# Patient Record
Sex: FEMALE | Race: WHITE | HISPANIC OR LATINO | Employment: FULL TIME | URBAN - METROPOLITAN AREA
[De-identification: names, ages, dates, MRNs, and addresses within clinical notes are randomized per-mention and may not be internally consistent; named-entity substitution may affect disease eponyms.]

---

## 2017-03-10 ENCOUNTER — TELEPHONE (OUTPATIENT)
Dept: OTHER | Facility: MEDICAL CENTER | Age: 42
End: 2017-03-10

## 2017-03-10 NOTE — PROGRESS NOTES
Phoned to schedule visit to deliver and review lung cancer treatment summary/ survivorship care plan.  Pt stated she cares for her 10 month old grandson 7 days a week and it would be inconvenient to come in to meet in person. Pt prefers to have SCP mailed. Will follow up by telephone once she receives it.

## 2017-04-04 ENCOUNTER — TELEPHONE (OUTPATIENT)
Dept: OTHER | Facility: MEDICAL CENTER | Age: 42
End: 2017-04-04

## 2017-04-04 NOTE — PROGRESS NOTES
Phoned to review lung cancer treatment summary/ survivorship care plan which was mailed to pt on 3/20/17.  No answer, left message to call back.

## 2017-04-07 ENCOUNTER — TELEPHONE (OUTPATIENT)
Dept: OTHER | Facility: MEDICAL CENTER | Age: 42
End: 2017-04-07

## 2017-04-07 NOTE — PROGRESS NOTES
Phoned to schedule visit to deliver and review lung cancer treatment summary/ survivorship care plan.  No answer, left message to call back.

## 2017-04-11 ENCOUNTER — TELEPHONE (OUTPATIENT)
Dept: OTHER | Facility: MEDICAL CENTER | Age: 42
End: 2017-04-11

## 2017-04-11 NOTE — PROGRESS NOTES
Third attempt to review lung cancer treatment summary/ survivorship care plan with patient by telephone. Left message to call back.

## 2017-08-20 ENCOUNTER — HOSPITAL ENCOUNTER (INPATIENT)
Facility: MEDICAL CENTER | Age: 42
LOS: 3 days | DRG: 872 | End: 2017-08-24
Attending: EMERGENCY MEDICINE | Admitting: INTERNAL MEDICINE
Payer: COMMERCIAL

## 2017-08-20 ENCOUNTER — APPOINTMENT (OUTPATIENT)
Dept: RADIOLOGY | Facility: MEDICAL CENTER | Age: 42
DRG: 872 | End: 2017-08-20
Attending: EMERGENCY MEDICINE
Payer: COMMERCIAL

## 2017-08-20 DIAGNOSIS — R50.9 FEVER, UNSPECIFIED FEVER CAUSE: ICD-10-CM

## 2017-08-20 DIAGNOSIS — N12 PYELONEPHRITIS: Primary | ICD-10-CM

## 2017-08-20 LAB
ALBUMIN SERPL BCP-MCNC: 3.3 G/DL (ref 3.2–4.9)
ALBUMIN/GLOB SERPL: 1.1 G/DL
ALP SERPL-CCNC: 62 U/L (ref 30–99)
ALT SERPL-CCNC: 23 U/L (ref 2–50)
ANION GAP SERPL CALC-SCNC: 8 MMOL/L (ref 0–11.9)
APPEARANCE UR: ABNORMAL
APTT PPP: 27.7 SEC (ref 24.7–36)
AST SERPL-CCNC: 20 U/L (ref 12–45)
BACTERIA #/AREA URNS HPF: ABNORMAL /HPF
BASOPHILS # BLD AUTO: 0.1 % (ref 0–1.8)
BASOPHILS # BLD: 0.01 K/UL (ref 0–0.12)
BILIRUB SERPL-MCNC: 0.6 MG/DL (ref 0.1–1.5)
BILIRUB UR QL STRIP.AUTO: NEGATIVE
BUN SERPL-MCNC: 11 MG/DL (ref 8–22)
CALCIUM SERPL-MCNC: 8.3 MG/DL (ref 8.4–10.2)
CHLORIDE SERPL-SCNC: 104 MMOL/L (ref 96–112)
CO2 SERPL-SCNC: 21 MMOL/L (ref 20–33)
COLOR UR: ABNORMAL
CREAT SERPL-MCNC: 0.8 MG/DL (ref 0.5–1.4)
EOSINOPHIL # BLD AUTO: 0.02 K/UL (ref 0–0.51)
EOSINOPHIL NFR BLD: 0.3 % (ref 0–6.9)
EPI CELLS #/AREA URNS HPF: ABNORMAL /HPF
ERYTHROCYTE [DISTWIDTH] IN BLOOD BY AUTOMATED COUNT: 43.8 FL (ref 35.9–50)
GFR SERPL CREATININE-BSD FRML MDRD: >60 ML/MIN/1.73 M 2
GLOBULIN SER CALC-MCNC: 2.9 G/DL (ref 1.9–3.5)
GLUCOSE SERPL-MCNC: 105 MG/DL (ref 65–99)
GLUCOSE UR STRIP.AUTO-MCNC: NEGATIVE MG/DL
HCG UR QL: NEGATIVE
HCT VFR BLD AUTO: 32.9 % (ref 37–47)
HGB BLD-MCNC: 10.4 G/DL (ref 12–16)
IMM GRANULOCYTES # BLD AUTO: 0.02 K/UL (ref 0–0.11)
IMM GRANULOCYTES NFR BLD AUTO: 0.3 % (ref 0–0.9)
INR PPP: 1.11 (ref 0.87–1.13)
KETONES UR STRIP.AUTO-MCNC: NEGATIVE MG/DL
LACTATE BLD-SCNC: 1.3 MMOL/L (ref 0.5–2)
LACTATE BLD-SCNC: 1.46 MMOL/L (ref 0.5–2)
LEUKOCYTE ESTERASE UR QL STRIP.AUTO: ABNORMAL
LYMPHOCYTES # BLD AUTO: 0.57 K/UL (ref 1–4.8)
LYMPHOCYTES NFR BLD: 7.3 % (ref 22–41)
MCH RBC QN AUTO: 22.8 PG (ref 27–33)
MCHC RBC AUTO-ENTMCNC: 31.6 G/DL (ref 33.6–35)
MCV RBC AUTO: 72 FL (ref 81.4–97.8)
MICRO URNS: ABNORMAL
MONOCYTES # BLD AUTO: 0.19 K/UL (ref 0–0.85)
MONOCYTES NFR BLD AUTO: 2.4 % (ref 0–13.4)
MUCOUS THREADS #/AREA URNS HPF: ABNORMAL /HPF
NEUTROPHILS # BLD AUTO: 7.01 K/UL (ref 2–7.15)
NEUTROPHILS NFR BLD: 89.6 % (ref 44–72)
NITRITE UR QL STRIP.AUTO: POSITIVE
NRBC # BLD AUTO: 0 K/UL
NRBC BLD AUTO-RTO: 0 /100 WBC
PH UR STRIP.AUTO: 5.5 [PH]
PHENYTOIN SERPL-MCNC: <2.5 UG/ML (ref 10–20)
PLATELET # BLD AUTO: 216 K/UL (ref 164–446)
PMV BLD AUTO: 10.3 FL (ref 9–12.9)
POTASSIUM SERPL-SCNC: 3.7 MMOL/L (ref 3.6–5.5)
PROT SERPL-MCNC: 6.2 G/DL (ref 6–8.2)
PROT UR QL STRIP: 30 MG/DL
PROTHROMBIN TIME: 14.1 SEC (ref 12–14.6)
RBC # BLD AUTO: 4.57 M/UL (ref 4.2–5.4)
RBC # URNS HPF: ABNORMAL /HPF
RBC UR QL AUTO: ABNORMAL
SODIUM SERPL-SCNC: 133 MMOL/L (ref 135–145)
SP GR UR REFRACTOMETRY: 1.01
SP GR UR STRIP.AUTO: 1.01
SPECIMEN DRAWN FROM PATIENT: NORMAL
SPECIMEN DRAWN FROM PATIENT: NORMAL
UNIDENT CRYS URNS QL MICRO: ABNORMAL /HPF
WBC # BLD AUTO: 7.8 K/UL (ref 4.8–10.8)
WBC #/AREA URNS HPF: ABNORMAL /HPF
YEAST #/AREA URNS HPF: ABNORMAL /HPF

## 2017-08-20 PROCEDURE — 87186 SC STD MICRODIL/AGAR DIL: CPT

## 2017-08-20 PROCEDURE — 96365 THER/PROPH/DIAG IV INF INIT: CPT

## 2017-08-20 PROCEDURE — 85610 PROTHROMBIN TIME: CPT

## 2017-08-20 PROCEDURE — 81025 URINE PREGNANCY TEST: CPT

## 2017-08-20 PROCEDURE — 83605 ASSAY OF LACTIC ACID: CPT | Mod: 91

## 2017-08-20 PROCEDURE — 80053 COMPREHEN METABOLIC PANEL: CPT

## 2017-08-20 PROCEDURE — 71010 DX-CHEST-PORTABLE (1 VIEW): CPT

## 2017-08-20 PROCEDURE — 96361 HYDRATE IV INFUSION ADD-ON: CPT

## 2017-08-20 PROCEDURE — 96375 TX/PRO/DX INJ NEW DRUG ADDON: CPT

## 2017-08-20 PROCEDURE — 87077 CULTURE AEROBIC IDENTIFY: CPT

## 2017-08-20 PROCEDURE — 87040 BLOOD CULTURE FOR BACTERIA: CPT

## 2017-08-20 PROCEDURE — 81001 URINALYSIS AUTO W/SCOPE: CPT

## 2017-08-20 PROCEDURE — 700111 HCHG RX REV CODE 636 W/ 250 OVERRIDE (IP): Performed by: EMERGENCY MEDICINE

## 2017-08-20 PROCEDURE — 85730 THROMBOPLASTIN TIME PARTIAL: CPT

## 2017-08-20 PROCEDURE — 99285 EMERGENCY DEPT VISIT HI MDM: CPT

## 2017-08-20 PROCEDURE — 80185 ASSAY OF PHENYTOIN TOTAL: CPT

## 2017-08-20 PROCEDURE — A9270 NON-COVERED ITEM OR SERVICE: HCPCS | Performed by: EMERGENCY MEDICINE

## 2017-08-20 PROCEDURE — 85025 COMPLETE CBC W/AUTO DIFF WBC: CPT

## 2017-08-20 PROCEDURE — 36415 COLL VENOUS BLD VENIPUNCTURE: CPT

## 2017-08-20 PROCEDURE — 700105 HCHG RX REV CODE 258: Performed by: EMERGENCY MEDICINE

## 2017-08-20 PROCEDURE — 700102 HCHG RX REV CODE 250 W/ 637 OVERRIDE(OP): Performed by: EMERGENCY MEDICINE

## 2017-08-20 PROCEDURE — 87086 URINE CULTURE/COLONY COUNT: CPT

## 2017-08-20 RX ORDER — SODIUM CHLORIDE 9 MG/ML
30 INJECTION, SOLUTION INTRAVENOUS ONCE
Status: COMPLETED | OUTPATIENT
Start: 2017-08-20 | End: 2017-08-21

## 2017-08-20 RX ORDER — CEFTRIAXONE 2 G/1
2 INJECTION, POWDER, FOR SOLUTION INTRAMUSCULAR; INTRAVENOUS ONCE
Status: COMPLETED | OUTPATIENT
Start: 2017-08-20 | End: 2017-08-20

## 2017-08-20 RX ORDER — METOCLOPRAMIDE 10 MG/1
10 TABLET ORAL ONCE
Status: DISCONTINUED | OUTPATIENT
Start: 2017-08-20 | End: 2017-08-20

## 2017-08-20 RX ORDER — PROMETHAZINE HYDROCHLORIDE 25 MG/1
25 TABLET ORAL ONCE
Status: COMPLETED | OUTPATIENT
Start: 2017-08-20 | End: 2017-08-20

## 2017-08-20 RX ORDER — ONDANSETRON 2 MG/ML
4 INJECTION INTRAMUSCULAR; INTRAVENOUS ONCE
Status: COMPLETED | OUTPATIENT
Start: 2017-08-20 | End: 2017-08-20

## 2017-08-20 RX ORDER — ACETAMINOPHEN 325 MG/1
1000 TABLET ORAL ONCE
Status: COMPLETED | OUTPATIENT
Start: 2017-08-20 | End: 2017-08-20

## 2017-08-20 RX ADMIN — SODIUM CHLORIDE 2490 ML: 9 INJECTION, SOLUTION INTRAVENOUS at 22:06

## 2017-08-20 RX ADMIN — PROMETHAZINE HYDROCHLORIDE 25 MG: 25 TABLET ORAL at 23:24

## 2017-08-20 RX ADMIN — CEFTRIAXONE SODIUM 2 G: 2 INJECTION, POWDER, FOR SOLUTION INTRAMUSCULAR; INTRAVENOUS at 23:24

## 2017-08-20 RX ADMIN — ACETAMINOPHEN 975 MG: 325 TABLET, FILM COATED ORAL at 22:34

## 2017-08-20 RX ADMIN — ONDANSETRON 4 MG: 2 INJECTION INTRAMUSCULAR; INTRAVENOUS at 22:34

## 2017-08-20 ASSESSMENT — ENCOUNTER SYMPTOMS
SHORTNESS OF BREATH: 0
COUGH: 0
DIAPHORESIS: 0
WEAKNESS: 0
DIARRHEA: 0
NECK PAIN: 0
CHILLS: 1
LOSS OF CONSCIOUSNESS: 0
VOMITING: 0
BACK PAIN: 0
ABDOMINAL PAIN: 0
HEMOPTYSIS: 1
NAUSEA: 1
DIZZINESS: 0
FLANK PAIN: 1
FEVER: 1

## 2017-08-20 NOTE — IP AVS SNAPSHOT
8/24/2017    Tiffany Gottlieb  [address]  [place] [address]    Dear Tiffany:    [facility] wants to ensure your discharge home is safe and you or your loved ones have had all of your questions answered regarding your care after you leave the hospital.    Below is a list of resources and contact information should you have any questions regarding your hospital stay, follow-up instructions, or active medical symptoms.    Questions or Concerns Regarding… Contact   Medical Questions Related to Your Discharge  (7 days a week, 8am-5pm) Contact a Nurse Care Coordinator   [phone]   Medical Questions Not Related to Your Discharge  (24 hours a day / 7 days a week)  Contact the Nurse Health Line   [phone]    Medications or Discharge Instructions Refer to your discharge packet   or contact your [facility] Primary Care Provider   [phone]   Follow-up Appointment(s) Schedule your appointment via Yola   or contact Scheduling [phone]   Billing Review your statement via Yola  or contact Billing [phone]   Medical Records Review your records via Yola   or contact Medical Records [phone]     You may receive a telephone call within two days of discharge. This call is to make certain you understand your discharge instructions and have the opportunity to have any questions answered. You can also easily access your medical information, test results and upcoming appointments via the Yola free online health management tool. You can learn more and sign up at [employer]Yola. For assistance setting up your Yola account, please call [phone].    Once again, we want to ensure your discharge home is safe and that you have a clear understanding of any next steps in your care. If you have any questions or concerns, please do not hesitate to contact us, we are here for you. Thank you for choosing [facility] for your healthcare needs.    Sincerely,    Your [facility] Healthcare Team

## 2017-08-20 NOTE — IP AVS SNAPSHOT
AccuVein Access Code: [email or web address]  Expires: 9/23/2017 10:41 AM    AccuVein  A secure, online tool to manage your health information     [employer] AccuVein® is a secure, online tool that connects you to your personalized health information from the privacy of your home -- day or night - making it very easy for you to manage your healthcare. Once the activation process is completed, you can even access your medical information using the AccuVein marcos, which is available for free in the Apple Marcos store or Google Play store.     AccuVein provides the following levels of access (as shown below):   My Chart Features   [facility] Primary Care Doctor [facility]  Specialists [facility]  Urgent  Care Non-[facility]  Primary Care  Doctor   Email your healthcare team securely and privately 24/7 X X X X   Manage appointments: schedule your next appointment; view details of past/upcoming appointments X      Request prescription refills. X      View recent personal medical records, including lab and immunizations X X X X   View health record, including health history, allergies, medications X X X X   Read reports about your outpatient visits, procedures, consult and ER notes X X X X   See your discharge summary, which is a recap of your hospital and/or ER visit that includes your diagnosis, lab results, and care plan. X X       How to register for AccuVein:  1. Go to  [email or web address].  2. Click on the Sign Up Now box, which takes you to the New Member Sign Up page. You will need to provide the following information:  a. Enter your AccuVein Access Code exactly as it appears at the top of this page. (You will not need to use this code after you’ve completed the sign-up process. If you do not sign up before the expiration date, you must request a new code.)   b. Enter your date of birth.   c. Enter your home email address.   d. Click Submit, and follow the next screen’s instructions.  3. Create a AccuVein ID. This will be your AccuVein  login ID and cannot be changed, so think of one that is secure and easy to remember.  4. Create a [employer] password. You can change your password at any time.  5. Enter your Password Reset Question and Answer. This can be used at a later time if you forget your password.   6. Enter your e-mail address. This allows you to receive e-mail notifications when new information is available in [employer].  7. Click Sign Up. You can now view your health information.    For assistance activating your [employer] account, call [phone]

## 2017-08-20 NOTE — IP AVS SNAPSHOT
" <p align=\"LEFT\"><IMG SRC=\"//[email or web address]$/[email or web address]\" alt=\"Image\" WIDTH=\"50%\" HEIGHT=\"200\" BORDER=\"\"></p>                   Name:Tiffany Gottlieb  Medical Record Number:[id number]  CSN: [id number]    YOB: 1975   Age: 41 y.o.  Sex: female  HT:1.575 m (5' 2.01\") WT: 85 kg (187 lb 6.3 oz)          Admit Date: 8/20/2017     Discharge Date:   Today's Date: 8/24/2017  Attending Doctor:  Daja Vazquez M.D.                  Allergies:  Contrast media with iodine and Hydrocodone          Follow-up Information     1. Follow up with BRENDA Moore. Go on 8/31/2017.    Why:  Please arrive at 9am for your appointment. Please bring photo ID, insurance cards, and list of medications.    Contact information    [address] [place] [address]  [place] [address] [phone]         Medication List      Take these Medications        Instructions    ciprofloxacin 500 MG Tabs   Commonly known as:  CIPRO    Take 1 Tab by mouth every 12 hours for 14 days.   Dose:  500 mg       morphine ER 30 MG Tbcr tablet   Commonly known as:  MS CONTIN    Take 30 mg by mouth every 8 hours as needed.   Dose:  30 mg       ondansetron 4 MG Tbdp   Commonly known as:  ZOFRAN ODT    Take 1 Tab by mouth every 8 hours as needed for Nausea/Vomiting.   Dose:  4 mg       oxycodone immediate release 10 MG immediate release tablet   Commonly known as:  ROXICODONE    Take 10 mg by mouth every four hours as needed for Moderate Pain.   Dose:  10 mg       tizanidine 4 MG Tabs   Commonly known as:  ZANAFLEX    Take 8 mg by mouth 3 times a day as needed (muscle spasms).   Dose:  8 mg         "

## 2017-08-20 NOTE — IP AVS SNAPSHOT
" Home Care Instructions                                                                                                                  Name:Tiffany Gottlieb  Medical Record Number:[id number]  CSN: [id number]    YOB: 1975   Age: 41 y.o.  Sex: female  HT:1.575 m (5' 2.01\") WT: 85 kg (187 lb 6.3 oz)          Admit Date: 8/20/2017     Discharge Date:   Today's Date: 8/24/2017  Attending Doctor:  Daja Vazquez M.D.                  Allergies:  Contrast media with iodine and Hydrocodone            Discharge Instructions       Discharge Instructions    Discharged to home by car with relative. Discharged via wheelchair, hospital escort: Yes.  Special equipment needed: Not Applicable    Be sure to schedule a follow-up appointment with your primary care doctor or any specialists as instructed.     Discharge Plan:   Influenza Vaccine Indication: Patient Refuses    I understand that a diet low in cholesterol, fat, and sodium is recommended for good health. Unless I have been given specific instructions below for another diet, I accept this instruction as my diet prescription.   Other diet: as tolerated    Special Instructions: None    · Is patient discharged on Warfarin / Coumadin?   No     · Is patient Post Blood Transfusion?  No  Pyelonephritis, Adult  Pyelonephritis is a kidney infection. In general, there are 2 main types of pyelonephritis:  · Infections that come on quickly without any warning (acute pyelonephritis).  · Infections that persist for a long period of time (chronic pyelonephritis).  CAUSES   Two main causes of pyelonephritis are:  · Bacteria traveling from the bladder to the kidney. This is a problem especially in pregnant women. The urine in the bladder can become filled with bacteria from multiple causes, including:  ¨ Inflammation of the prostate gland (prostatitis).  ¨ Sexual intercourse in females.  ¨ Bladder infection (cystitis).  · Bacteria traveling from the bloodstream to the tissue part " of the kidney.  Problems that may increase your risk of getting a kidney infection include:  · Diabetes.  · Kidney stones or bladder stones.  · Cancer.  · Catheters placed in the bladder.  · Other abnormalities of the kidney or ureter.  SYMPTOMS   · Abdominal pain.  · Pain in the side or flank area.  · Fever.  · Chills.  · Upset stomach.  · Blood in the urine (dark urine).  · Frequent urination.  · Strong or persistent urge to urinate.  · Burning or stinging when urinating.  DIAGNOSIS   Your caregiver may diagnose your kidney infection based on your symptoms. A urine sample may also be taken.  TREATMENT   In general, treatment depends on how severe the infection is.   · If the infection is mild and caught early, your caregiver may treat you with oral antibiotics and send you home.  · If the infection is more severe, the bacteria may have gotten into the bloodstream. This will require intravenous (IV) antibiotics and a hospital stay. Symptoms may include:  ¨ High fever.  ¨ Severe flank pain.  ¨ Shaking chills.  · Even after a hospital stay, your caregiver may require you to be on oral antibiotics for a period of time.  · Other treatments may be required depending upon the cause of the infection.  HOME CARE INSTRUCTIONS   · Take your antibiotics as directed. Finish them even if you start to feel better.  · Make an appointment to have your urine checked to make sure the infection is gone.  · Drink enough fluids to keep your urine clear or pale yellow.  · Take medicines for the bladder if you have urgency and frequency of urination as directed by your caregiver.  SEEK IMMEDIATE MEDICAL CARE IF:   · You have a fever or persistent symptoms for more than 2-3 days.  · You have a fever and your symptoms suddenly get worse.  · You are unable to take your antibiotics or fluids.  · You develop shaking chills.  · You experience extreme weakness or fainting.  · There is no improvement after 2 days of treatment.  MAKE SURE  YOU:  · Understand these instructions.  · Will watch your condition.  · Will get help right away if you are not doing well or get worse.     This information is not intended to replace advice given to you by your health care provider. Make sure you discuss any questions you have with your health care provider.     Document Released: 12/18/2006 Document Revised: 06/18/2013 Document Reviewed: 05/23/2012  [employer] Interactive Patient Education ©2016 Elsevier Inc.    Sepsis, Adult  Sepsis is a serious infection of your blood or tissues that affects your whole body. The infection that causes sepsis may be bacterial, viral, fungal, or parasitic. Sepsis may be life threatening. Sepsis can cause your blood pressure to drop. This may result in shock. Shock causes your central nervous system and your organs to stop working correctly.   RISK FACTORS  Sepsis can happen in anyone, but it is more likely to happen in people who have weakened immune systems.  SIGNS AND SYMPTOMS   Symptoms of sepsis can include:  · Fever or low body temperature (hypothermia).  · Rapid breathing (hyperventilation).  · Chills.  · Rapid heartbeat (tachycardia).  · Confusion or light-headedness.  · Trouble breathing.  · Urinating much less than usual.  · Cool, clammy skin or red, flushed skin.  · Other problems with the heart, kidneys, or brain.  DIAGNOSIS   Your health care provider will likely do tests to look for an infection, to see if the infection has spread to your blood, and to see how serious your condition is. Tests can include:  · Blood tests, including cultures of your blood.  · Cultures of other fluids from your body, such as:  ¨ Urine.  ¨ Pus from wounds.  ¨ Mucus coughed up from your lungs.  · Urine tests other than cultures.  · X-ray exams or other imaging tests.  TREATMENT   Treatment will begin with elimination of the source of infection. If your sepsis is likely caused by a bacterial or fungal infection, you will be given antibiotic  or antifungal medicines.  You may also receive:  · Oxygen.  · Fluids through an IV tube.  · Medicines to increase your blood pressure.  · A machine to clean your blood (dialysis) if your kidneys fail.  · A machine to help you breathe if your lungs fail.  SEEK IMMEDIATE MEDICAL CARE IF:  You get an infection or develop any of the signs and symptoms of sepsis after surgery or a hospitalization.     This information is not intended to replace advice given to you by your health care provider. Make sure you discuss any questions you have with your health care provider.     Document Released: 09/15/2004 Document Revised: 05/03/2016 Document Reviewed: 08/25/2014  [employer] Interactive Patient Education ©2016 Elsevier Inc.    Depression / Suicide Risk    As you are discharged from this [facility] facility, it is important to learn how to keep safe from harming yourself.    Recognize the warning signs:  · Abrupt changes in personality, positive or negative- including increase in energy   · Giving away possessions  · Change in eating patterns- significant weight changes-  positive or negative  · Change in sleeping patterns- unable to sleep or sleeping all the time   · Unwillingness or inability to communicate  · Depression  · Unusual sadness, discouragement and loneliness  · Talk of wanting to die  · Neglect of personal appearance   · Rebelliousness- reckless behavior  · Withdrawal from people/activities they love  · Confusion- inability to concentrate     If you or a loved one observes any of these behaviors or has concerns about self-harm, here's what you can do:  · Talk about it- your feelings and reasons for harming yourself  · Remove any means that you might use to hurt yourself (examples: pills, rope, extension cords, firearm)  · Get professional help from the community (Mental Health, Substance Abuse, psychological counseling)  · Do not be alone:Call your Safe Contact- someone whom you trust who will be there for  you.  · Call your local CRISIS HOTLINE 198-7046 or 835-700-7788  · Call your local Children's Mobile Crisis Response Team [place] (511) 163-9362 or www.[employer]  · Call the toll free National Suicide Prevention Hotlines   · National Suicide Prevention Lifeline 715-470-MWXV (1712)  · National Hope Line Network 800-SUICIDE (885-0232)        Follow-up Information     1. Follow up with BRENDA Moore. Go on 8/31/2017.    Why:  Please arrive at 9am for your appointment. Please bring photo ID, insurance cards, and list of medications.    Contact information    [address] [place] [address]  [place] [address] [phone]         Discharge Medication Instructions:    Below are the medications your physician expects you to take upon discharge:    Review all your home medications and newly ordered medications with your doctor and/or pharmacist. Follow medication instructions as directed by your doctor and/or pharmacist.    Please keep your medication list with you and share with your physician.               Medication List      START taking these medications        Instructions    Morning Afternoon Evening Bedtime    ciprofloxacin 500 MG Tabs   Last time this was given:  500 mg on 8/24/2017  9:10 AM   Commonly known as:  CIPRO        Take 1 Tab by mouth every 12 hours for 14 days.   Dose:  500 mg                        ondansetron 4 MG Tbdp   Commonly known as:  ZOFRAN ODT        Take 1 Tab by mouth every 8 hours as needed for Nausea/Vomiting.   Dose:  4 mg                          CONTINUE taking these medications        Instructions    Morning Afternoon Evening Bedtime    morphine ER 30 MG Tbcr tablet   Last time this was given:  30 mg on 8/24/2017  6:39 AM   Commonly known as:  MS CONTIN        Take 30 mg by mouth every 8 hours as needed.   Dose:  30 mg                        oxycodone immediate release 10 MG immediate release tablet   Last time this was given:  5 mg on 8/24/2017  9:11 AM   Commonly known as:   ROXICODONE        Take 10 mg by mouth every four hours as needed for Moderate Pain.   Dose:  10 mg                        tizanidine 4 MG Tabs   Last time this was given:  8 mg on 8/23/2017 10:12 PM   Commonly known as:  ZANAFLEX        Take 8 mg by mouth 3 times a day as needed (muscle spasms).   Dose:  8 mg                             Where to Get Your Medications      These medications were sent to [facility]  [place] - [address]  [address] [place] [address]     Phone:  [phone]    - ciprofloxacin 500 MG Tabs  - ondansetron 4 MG Tbdp            Instructions           Diet / Nutrition:    Follow any diet instructions given to you by your doctor or the dietician, including how much salt (sodium) you are allowed each day.    If you are overweight, talk to your doctor about a weight reduction plan.    Activity:    Remain physically active following your doctor's instructions about exercise and activity.    Rest often.     Any time you become even a little tired or short of breath, SIT DOWN and rest.    Worsening Symptoms:    Report any of the following signs and symptoms to the doctor's office immediately:    *Pain of jaw, arm, or neck  *Chest pain not relieved by medication                               *Dizziness or loss of consciousness  *Difficulty breathing even when at rest   *More tired than usual                                       *Bleeding drainage or swelling of surgical site  *Swelling of feet, ankles, legs or stomach                 *Fever (>100ºF)  *Pink or blood tinged sputum  *Weight gain (3lbs/day or 5lbs /week)           *Shock from internal defibrillator (if applicable)  *Palpitations or irregular heartbeats                *Cool and/or numb extremities    Stroke Awareness    Common Risk Factors for Stroke include:    Age  Atrial Fibrillation  Carotid Artery Stenosis  Diabetes Mellitus  Excessive alcohol consumption  High blood pressure  Overweight   Physical  inactivity  Smoking    Warning signs and symptoms of a stroke include:    *Sudden numbness or weakness of the face, arm or leg (especially on one side of the body).  *Sudden confusion, trouble speaking or understanding.  *Sudden trouble seeing in one or both eyes.  *Sudden trouble walking, dizziness, loss of balance or coordination.Sudden severe headache with no known cause.    It is very important to get treatment quickly when a stroke occurs. If you experience any of the above warning signs, call 911 immediately.                   Disclaimer         Quit Smoking / Tobacco Use:    I understand the use of any tobacco products increases my chance of suffering from future heart disease or stroke and could cause other illnesses which may shorten my life. Quitting the use of tobacco products is the single most important thing I can do to improve my health. For further information on smoking / tobacco cessation call a Toll Free Quit Line at 1-670.271.2158 (*National Cancer [place]) or 1-150.301.2327 (American Lung Association) or you can access the web based program at www.lungDrink Up Downtown.org.    [place] Tobacco Users Help Line:  (303) 661-8567       Toll Free: 1-538.271.8342  Quit Tobacco Program [facility] (951)906-3520    Crisis Hotline:    [place] Crisis Hotline:  7-031-RRMHOXF or 1-623.896.9694    [place] Crisis Hotline:    1-890.828.9906 or 893-615-6579    Discharge Survey:   Thank you for choosing [facility]. We hope we did everything we could to make your hospital stay a pleasant one. You may be receiving a phone survey and we would appreciate your time and participation in answering the questions. Your input is very valuable to us in our efforts to improve our service to our patients and their families.        My signature on this form indicates that:    1. I have reviewed and understand the above information.  2. My questions regarding this information have been answered to my  satisfaction.  3. I have formulated a plan with my discharge nurse to obtain my prescribed medications for home.                  Disclaimer         __________________________________                     __________       ________                       Patient Signature                                                 Date                    Time

## 2017-08-21 ENCOUNTER — RESOLUTE PROFESSIONAL BILLING HOSPITAL PROF FEE (OUTPATIENT)
Dept: HOSPITALIST | Facility: MEDICAL CENTER | Age: 42
End: 2017-08-21
Payer: COMMERCIAL

## 2017-08-21 ENCOUNTER — APPOINTMENT (OUTPATIENT)
Dept: RADIOLOGY | Facility: MEDICAL CENTER | Age: 42
DRG: 872 | End: 2017-08-21
Attending: INTERNAL MEDICINE
Payer: COMMERCIAL

## 2017-08-21 PROBLEM — A41.9 SEPSIS, UNSPECIFIED: Status: ACTIVE | Noted: 2017-08-21

## 2017-08-21 PROBLEM — N12 PYELONEPHRITIS: Status: ACTIVE | Noted: 2017-08-21

## 2017-08-21 PROBLEM — N39.0 URINARY TRACT INFECTION: Status: ACTIVE | Noted: 2017-08-21

## 2017-08-21 LAB
LACTATE BLD-SCNC: 0.96 MMOL/L (ref 0.5–2)
PROCALCITONIN SERPL-MCNC: 1.44 NG/ML
SPECIMEN DRAWN FROM PATIENT: NORMAL

## 2017-08-21 PROCEDURE — 770006 HCHG ROOM/CARE - MED/SURG/GYN SEMI*

## 2017-08-21 PROCEDURE — 76775 US EXAM ABDO BACK WALL LIM: CPT

## 2017-08-21 PROCEDURE — A9270 NON-COVERED ITEM OR SERVICE: HCPCS | Performed by: INTERNAL MEDICINE

## 2017-08-21 PROCEDURE — 84145 PROCALCITONIN (PCT): CPT

## 2017-08-21 PROCEDURE — 96375 TX/PRO/DX INJ NEW DRUG ADDON: CPT

## 2017-08-21 PROCEDURE — 700111 HCHG RX REV CODE 636 W/ 250 OVERRIDE (IP): Performed by: INTERNAL MEDICINE

## 2017-08-21 PROCEDURE — 700105 HCHG RX REV CODE 258: Performed by: INTERNAL MEDICINE

## 2017-08-21 PROCEDURE — 83605 ASSAY OF LACTIC ACID: CPT

## 2017-08-21 PROCEDURE — 96361 HYDRATE IV INFUSION ADD-ON: CPT

## 2017-08-21 PROCEDURE — 700111 HCHG RX REV CODE 636 W/ 250 OVERRIDE (IP): Performed by: EMERGENCY MEDICINE

## 2017-08-21 PROCEDURE — 74176 CT ABD & PELVIS W/O CONTRAST: CPT

## 2017-08-21 PROCEDURE — 700102 HCHG RX REV CODE 250 W/ 637 OVERRIDE(OP): Performed by: INTERNAL MEDICINE

## 2017-08-21 PROCEDURE — 99223 1ST HOSP IP/OBS HIGH 75: CPT | Performed by: INTERNAL MEDICINE

## 2017-08-21 RX ORDER — OXYCODONE HYDROCHLORIDE 5 MG/1
5 TABLET ORAL
Status: DISCONTINUED | OUTPATIENT
Start: 2017-08-21 | End: 2017-08-24 | Stop reason: HOSPADM

## 2017-08-21 RX ORDER — BISACODYL 10 MG
10 SUPPOSITORY, RECTAL RECTAL
Status: DISCONTINUED | OUTPATIENT
Start: 2017-08-21 | End: 2017-08-24 | Stop reason: HOSPADM

## 2017-08-21 RX ORDER — AMOXICILLIN 250 MG
2 CAPSULE ORAL 2 TIMES DAILY
Status: DISCONTINUED | OUTPATIENT
Start: 2017-08-21 | End: 2017-08-24 | Stop reason: HOSPADM

## 2017-08-21 RX ORDER — ACETAMINOPHEN 325 MG/1
650 TABLET ORAL EVERY 6 HOURS PRN
Status: DISCONTINUED | OUTPATIENT
Start: 2017-08-21 | End: 2017-08-24 | Stop reason: HOSPADM

## 2017-08-21 RX ORDER — SODIUM CHLORIDE 9 MG/ML
INJECTION, SOLUTION INTRAVENOUS CONTINUOUS
Status: DISCONTINUED | OUTPATIENT
Start: 2017-08-21 | End: 2017-08-23

## 2017-08-21 RX ORDER — OXYCODONE HYDROCHLORIDE 5 MG/1
2.5 TABLET ORAL
Status: DISCONTINUED | OUTPATIENT
Start: 2017-08-21 | End: 2017-08-24 | Stop reason: HOSPADM

## 2017-08-21 RX ORDER — PHENAZOPYRIDINE HYDROCHLORIDE 200 MG/1
200 TABLET, FILM COATED ORAL 3 TIMES DAILY PRN
Status: DISCONTINUED | OUTPATIENT
Start: 2017-08-21 | End: 2017-08-24 | Stop reason: HOSPADM

## 2017-08-21 RX ORDER — BUTALBITAL, ACETAMINOPHEN AND CAFFEINE 50; 325; 40 MG/1; MG/1; MG/1
1 TABLET ORAL EVERY 6 HOURS PRN
Status: DISCONTINUED | OUTPATIENT
Start: 2017-08-21 | End: 2017-08-24 | Stop reason: HOSPADM

## 2017-08-21 RX ORDER — POLYETHYLENE GLYCOL 3350 17 G/17G
1 POWDER, FOR SOLUTION ORAL
Status: DISCONTINUED | OUTPATIENT
Start: 2017-08-21 | End: 2017-08-24 | Stop reason: HOSPADM

## 2017-08-21 RX ORDER — SODIUM CHLORIDE 9 MG/ML
500 INJECTION, SOLUTION INTRAVENOUS
Status: DISCONTINUED | OUTPATIENT
Start: 2017-08-21 | End: 2017-08-24 | Stop reason: HOSPADM

## 2017-08-21 RX ADMIN — PROCHLORPERAZINE EDISYLATE 10 MG: 5 INJECTION INTRAMUSCULAR; INTRAVENOUS at 02:19

## 2017-08-21 RX ADMIN — HYDROMORPHONE HYDROCHLORIDE 0.25 MG: 1 INJECTION, SOLUTION INTRAMUSCULAR; INTRAVENOUS; SUBCUTANEOUS at 22:03

## 2017-08-21 RX ADMIN — BUTALBITAL, ACETAMINOPHEN, AND CAFFEINE 1 TABLET: 50; 325; 40 TABLET ORAL at 15:20

## 2017-08-21 RX ADMIN — PROCHLORPERAZINE EDISYLATE 10 MG: 5 INJECTION INTRAMUSCULAR; INTRAVENOUS at 15:20

## 2017-08-21 RX ADMIN — BUTALBITAL, ACETAMINOPHEN, AND CAFFEINE 1 TABLET: 50; 325; 40 TABLET ORAL at 06:44

## 2017-08-21 RX ADMIN — OXYCODONE HYDROCHLORIDE 5 MG: 5 TABLET ORAL at 20:21

## 2017-08-21 RX ADMIN — PHENAZOPYRIDINE HYDROCHLORIDE 200 MG: 200 TABLET ORAL at 20:20

## 2017-08-21 RX ADMIN — CEFTRIAXONE 2 G: 2 INJECTION, POWDER, FOR SOLUTION INTRAMUSCULAR; INTRAVENOUS at 20:12

## 2017-08-21 RX ADMIN — HYDROMORPHONE HYDROCHLORIDE 0.25 MG: 1 INJECTION, SOLUTION INTRAMUSCULAR; INTRAVENOUS; SUBCUTANEOUS at 06:44

## 2017-08-21 RX ADMIN — HYDROMORPHONE HYDROCHLORIDE 0.5 MG: 1 INJECTION, SOLUTION INTRAMUSCULAR; INTRAVENOUS; SUBCUTANEOUS at 00:42

## 2017-08-21 RX ADMIN — PROCHLORPERAZINE EDISYLATE 10 MG: 5 INJECTION INTRAMUSCULAR; INTRAVENOUS at 22:04

## 2017-08-21 RX ADMIN — SODIUM CHLORIDE: 9 INJECTION, SOLUTION INTRAVENOUS at 01:35

## 2017-08-21 RX ADMIN — OXYCODONE HYDROCHLORIDE 5 MG: 5 TABLET ORAL at 06:45

## 2017-08-21 RX ADMIN — SODIUM CHLORIDE: 9 INJECTION, SOLUTION INTRAVENOUS at 09:30

## 2017-08-21 RX ADMIN — OXYCODONE HYDROCHLORIDE 5 MG: 5 TABLET ORAL at 02:19

## 2017-08-21 RX ADMIN — OXYCODONE HYDROCHLORIDE 5 MG: 5 TABLET ORAL at 15:20

## 2017-08-21 RX ADMIN — SODIUM CHLORIDE: 9 INJECTION, SOLUTION INTRAVENOUS at 18:34

## 2017-08-21 RX ADMIN — PHENAZOPYRIDINE HYDROCHLORIDE 200 MG: 200 TABLET ORAL at 02:19

## 2017-08-21 RX ADMIN — HYDROMORPHONE HYDROCHLORIDE 0.25 MG: 1 INJECTION, SOLUTION INTRAMUSCULAR; INTRAVENOUS; SUBCUTANEOUS at 03:43

## 2017-08-21 RX ADMIN — HYDROMORPHONE HYDROCHLORIDE 0.25 MG: 1 INJECTION, SOLUTION INTRAMUSCULAR; INTRAVENOUS; SUBCUTANEOUS at 14:56

## 2017-08-21 ASSESSMENT — ENCOUNTER SYMPTOMS
ABDOMINAL PAIN: 1
LOSS OF CONSCIOUSNESS: 0
COUGH: 0
TREMORS: 0
CHILLS: 1
WEAKNESS: 0
NAUSEA: 0
MYALGIAS: 1
EYE REDNESS: 0
WHEEZING: 0
EYE PAIN: 0
INSOMNIA: 0
DIARRHEA: 0
FOCAL WEAKNESS: 0
FEVER: 1
FALLS: 0
DIZZINESS: 0
FLANK PAIN: 1
BLOOD IN STOOL: 0
HEADACHES: 0
SEIZURES: 0
VOMITING: 0
NERVOUS/ANXIOUS: 0
CONSTIPATION: 0
HEMOPTYSIS: 0
SHORTNESS OF BREATH: 0
PALPITATIONS: 0

## 2017-08-21 ASSESSMENT — PATIENT HEALTH QUESTIONNAIRE - PHQ9
2. FEELING DOWN, DEPRESSED, IRRITABLE, OR HOPELESS: NOT AT ALL
SUM OF ALL RESPONSES TO PHQ QUESTIONS 1-9: 0
1. LITTLE INTEREST OR PLEASURE IN DOING THINGS: NOT AT ALL
SUM OF ALL RESPONSES TO PHQ9 QUESTIONS 1 AND 2: 0

## 2017-08-21 ASSESSMENT — PAIN SCALES - GENERAL
PAINLEVEL_OUTOF10: 8
PAINLEVEL_OUTOF10: 10
PAINLEVEL_OUTOF10: 6
PAINLEVEL_OUTOF10: 8
PAINLEVEL_OUTOF10: 9
PAINLEVEL_OUTOF10: 8
PAINLEVEL_OUTOF10: 10
PAINLEVEL_OUTOF10: 10

## 2017-08-21 ASSESSMENT — LIFESTYLE VARIABLES
ALCOHOL_USE: NO
EVER_SMOKED: NEVER
EVER_SMOKED: NEVER

## 2017-08-21 NOTE — PROGRESS NOTES
0700 Report received from [facility] nurseZo, at bedside. Pt is resting in bed & eyes closed.    0930 Pt is back from CT-renal.    1240 Kimberly from Lab called with critical result of positive blood cx w gram negative rods at 1238. Critical lab result read back to Kimberly. Dr. Gomez notified of critical lab result at 1240.  Critical lab result read back by Dr. Gomez.    1520 Pain meds and nausea med given for abd, flank, head pain 10/10. Home pain meds (morphine 30mg BID & oxycodone 10mg) are held due to low BP per Dr. Gomez & pt is informed.    1900 Report given to NOC nurse, Artie, at bedside.

## 2017-08-21 NOTE — ASSESSMENT & PLAN NOTE
On narcotics for chronic pain syndrome and degenerative joint disease. No change in regimen needed today  On pain control anyway.

## 2017-08-21 NOTE — ED PROVIDER NOTES
"ED Provider Note  CHIEF COMPLAINT  Chief Complaint   Patient presents with   • Flank Pain     Pt c/o bilateral flank pain. Pt has history of kidney stones and states this \"attack\" has been ongoing for over one week.    • Blood in Sputum     Pt states she had \"half of her right lung removed\" in November 2016. Pt states she began coughing up blood yesterday       HPI  Tiffany Gottlieb is a 41 y.o. Female with history of seizure disorder, and no bronchial mass resection, fibromyalgia, gastric bypass who presents lower pelvic pain, burning with urination, flank pain that started over a week ago. She came in today because she developed fever. Temperature at triage while 104.8. She says she's been also having nausea. She reports coughing up teaspoon of blood yesterday. She has not had any further episodes today. So blood in stool. She has not taken anything for bilateral flank pain. Flank pain is aching and throbbing constant not radiating.    REVIEW OF SYSTEMS  Review of Systems   Constitutional: Positive for fever, chills and malaise/fatigue. Negative for diaphoresis.   Respiratory: Positive for hemoptysis. Negative for cough and shortness of breath.    Cardiovascular: Negative for chest pain.   Gastrointestinal: Positive for nausea. Negative for vomiting, abdominal pain and diarrhea.   Genitourinary: Positive for dysuria, frequency and flank pain.   Musculoskeletal: Negative for back pain and neck pain.   Skin: Negative for rash.   Neurological: Negative for dizziness, loss of consciousness, weakness and headaches.   All other systems reviewed and are negative.    See HPI for further details. All other systems are negative.     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    SOCIAL HISTORY  Social History     Social History Main Topics   • Smoking status: Passive Smoke Exposure - Never Smoker -- 2.00 packs/day for 14 years     Types: Cigarettes   • Smokeless tobacco: Never Used   • Alcohol Use: No      Comment: parents were both chain smokers, lived withthem for 14 years   • Drug Use: No   • Sexual Activity: Not on file       SURGICAL HISTORY   has past surgical history that includes gastric bypass laparoscopic; cholecystectomy; exploratory laparotomy (4/5/2011); gastroscopy (4/17/2011); gastroscopy (4/28/2011); gastrostomy laparoscopic (5/16/2011); primary c section; hysterectomy laparoscopy; thoracotomy (Right, 11/14/2016); lobectomy (11/14/2016); and bronchoscopy (11/14/2016).    CURRENT MEDICATIONS  Home Medications     **Home medications have not yet been reviewed for this encounter**          ALLERGIES  Allergies   Allergen Reactions   • Contrast Media With Iodine [Iodine] Itching     After CT with IV and oral contrast on 4/15/14   • Hydrocodone Itching     Rxn - 2011  Hands itch       PHYSICAL EXAM  VITAL SIGNS: /61 mmHg  Pulse 99  Temp(Src) 40.4 °C (104.8 °F)  Resp 21  Ht 1.575 m (5' 2\")  Wt 83 kg (182 lb 15.7 oz)  BMI 33.46 kg/m2  SpO2 100%    Pulse ox interpretation: I interpret this pulse ox as normal.  Constitutional: Alert in no apparent distress. Appears fatigued.  HENT: No signs of trauma, Bilateral external ears normal, Nose normal.   Eyes: Pupils are equal and reactive, Conjunctiva normal, Non-icteric.   Neck: Normal range of motion, No tenderness, Supple, No stridor.   Lymphatic: No lymphadenopathy noted.   Cardiovascular: Regular rate and rhythm, no murmurs.   Thorax & Lungs: Normal breath sounds, No respiratory distress, No wheezing, No chest tenderness.   Abdomen: Bowel sounds normal, Soft, suprapubic tenderness, No masses, No pulsatile masses. No peritoneal signs.  Skin: Warm, Dry, No erythema, No rash. "   Back: No bony tenderness, No CVA tenderness. Bilateral flank pain to percussion.  Extremities: Intact distal pulses, No edema, No tenderness, No cyanosis. Brisk capillary refill.  Musculoskeletal: Good range of motion in all major joints. No tenderness to palpation or major deformities noted.   Neurologic: Alert , Normal motor function, Normal sensory function, No focal deficits noted.   Psychiatric: Affect normal, Judgment normal, Mood normal.       DIAGNOSTIC STUDIES / PROCEDURES    EKG  none    LABS  Pertinent Labs & Imaging studies reviewed. (See chart for details)    RADIOLOGY  Pertinent Labs & Imaging studies reviewed. (See chart for details)    COURSE & MEDICAL DECISION MAKING  Pertinent Labs & Imaging studies reviewed. (See chart for details)    This emergent evaluation of a 41-year-old woman who has had multiple medical problems including lung tumors, PE on anticoagulation, gastric bypass, prior UTIs presenting with what appears to be a urinary tract infection. She meets sepsis criteria with pulse of 129 temperature 104.8 Fahrenheit. Blood pressure is normal. On exam she has bilateral flank tenderness to percussion. No abdominal tenderness. Clear lung sounds bilaterally. Differential includes pyelonephritis, viral syndrome, pneumonia, bronchitis, recurrence of lung tumor. Sepsis protocol initiated. She'll be given 2.5 L of normal saline. White count 7 with left shift. 133. Lactic acid within normal limits. Chest x-ray is done which showed scarring at the right midlung. No signs of patchy infiltrates, effusions or other signs to suggest hemorrhage. UA with nitrates and many bacteria blood white cells. I believe her source of fever is from UTI. She is given Rocephin. She will need to be admitted for sepsis.    12:09 AM Discussed admission with Dr. Suero. She has been admitted to the hospitalist service.       FINAL IMPRESSION  1. Pyelonephritis      Electronically signed by: Barak Nieves II,  8/20/2017 11:08 PM

## 2017-08-21 NOTE — ED NOTES
"Pt continues to c/o nausea, HA, suprapubic and vaginal pain. Reports medications have \" not helped at all\". ERP aware.   "

## 2017-08-21 NOTE — CARE PLAN
Problem: Communication  Goal: The ability to communicate needs accurately and effectively will improve  Outcome: PROGRESSING AS EXPECTED  Oriented patient to the call light in order to alert staff of her needs. Educated patient about the plan of care, procedures, treatments, medications, and allowing for patient questions and answering them appropriately    Problem: Infection  Goal: Will remain free from infection  Outcome: PROGRESSING AS EXPECTED  Administering antiinfective (Rocephin IV)as ordered and assessing for signs and symptoms of improvement

## 2017-08-21 NOTE — ASSESSMENT & PLAN NOTE
With positive blood cultures growing E coli, pan senstitive  Anticipate that ID will let her discharge home tomorrow on oral antibiotics  Renal ultrasound shows some mild right hydronephrosis consistent with a recently passed stone  Pain control

## 2017-08-21 NOTE — PROGRESS NOTES
- seen and admitted by my partner, Dr. Bonilla this morning.   - 41/F with h/o kidney stones, admitted for flank pain, chills, fever, and malaise. Renal US showed mild right hydronephrosis. UA grossly dirty. Felt to have pyelonephritis. Lactate WNL.  - continue IV ceftriaxone. Follow urine culture. Will get CT renal colic to evaluate for obstructing stones that needs to be relieved.   - rest of A&P per Dr. Bonilla's H&P.

## 2017-08-21 NOTE — H&P
" Hospital Medicine History and Physical    Date of Service  8/21/2017    Chief Complaint  Chief Complaint   Patient presents with   • Flank Pain     Pt c/o bilateral flank pain. Pt has history of kidney stones and states this \"attack\" has been ongoing for over one week.    • Blood in Sputum     Pt states she had \"half of her right lung removed\" in November 2016. Pt states she began coughing up blood yesterday       History of Presenting Illness  41 y.o. female with history of kidney stones who presented 8/20/2017 with flank pain, chills, fever, malaise. She mentions that she's been having sharp suprapubic pain in the right with an intensity score of 8-9 out of 10. She doesn't complain of a right-sided flank pain is minor seems to be pruritic. She also admitted to dysuria. As mentioned above she also has fever and chills as well as malaise. She denied not drinking enough fluids, recent travel or sick contacts. She also has a headache and nausea at the medical floor and was asking something for that. She seems to be complaining of pain a lot and does take narcotics for DJD. She denied any rash, chest pain, shortness of breath or dysuria.   Emergency room she was febrile but hemodynamically stable. No leukocytosis and lactic acid was normal. Because of her presentation she was started on Rocephin.  When I saw her on the medical floor she was in no acute distress. Her pain is out of proportion to exam but may need supervision with pain. Auscultation is clear.    Primary Care Physician  Pcp Pt States None    Consultants  None    Code Status  Full    Review of Systems  Review of Systems   Constitutional: Positive for fever, chills and malaise/fatigue.   HENT: Negative for congestion, hearing loss and nosebleeds.    Eyes: Negative for pain and redness.   Respiratory: Negative for cough, hemoptysis, shortness of breath and wheezing.    Cardiovascular: Negative for chest pain and palpitations.   Gastrointestinal: Positive " for abdominal pain (suprapubic). Negative for nausea, vomiting, diarrhea, constipation and blood in stool.   Genitourinary: Positive for dysuria and flank pain (mild of the right slightly pruritic). Negative for frequency and hematuria.   Musculoskeletal: Positive for myalgias and joint pain. Negative for falls.   Skin: Negative for rash.   Neurological: Negative for dizziness, tremors, focal weakness, seizures, loss of consciousness, weakness and headaches.   Psychiatric/Behavioral: The patient is not nervous/anxious and does not have insomnia.    All other systems reviewed and are negative.         Past Medical History  Past Medical History   Diagnosis Date   • Fibromyalgia    • Myalgia    • Chronic low back pain    • Degenerative disc disease      lumbar   • Seizure (CMS-HCC)      grand mal- last one 3/2015   • Pulmonary embolism (CMS-HCC) 2011     no blood thinners anymore   • Dental disorder      missing teeth top and bottom, 4 molars that are broken in the back   • Coughing blood      10/2016   • Carcinoid tumor of right lung 10/2016   • Breath shortness      no inhalers no oxygen   • Psychiatric problem      anxiety and depression   • Arrhythmia      heart murmur when she was a kid   • History of gastric ulcer      ruptured gastric ulcer, punctured stomach while inpatient, 3 month stay       Surgical History  Past Surgical History   Procedure Laterality Date   • Gastric bypass laparoscopic       2004   • Cholecystectomy     • Exploratory laparotomy  4/5/2011     Performed by BEAN YOU at SUR[facility]ERY [facility]   • Gastroscopy  4/17/2011     Performed by RASHI HOFFMAN at SURGERY [facility]   • Gastroscopy  4/28/2011     Performed by BEAN ANDRES at SURGERY [facility]   • Gastrostomy laparoscopic  5/16/2011     Performed by TITI SELLERS at SURGERY [facility]   • Primary c section     • Hysterectomy laparoscopy       partial, removed utereus, still has BSO   • Thoracotomy Right  2016     Procedure: THORACOTOMY;  Surgeon: John H Ganser, M.D.;  Location: SURGERY [facility];  Service:    • Lobectomy  2016     Procedure: LOBECTOMY middle   ;  Surgeon: John H Ganser, M.D.;  Location: SURGERY [facility];  Service:    • Bronchoscopy  2016     Procedure: BRONCHOSCOPY  ;  Surgeon: John H Ganser, M.D.;  Location: SURGERY [facility];  Service:        Medications  No current facility-administered medications on file prior to encounter.     Current Outpatient Prescriptions on File Prior to Encounter   Medication Sig Dispense Refill   • oxycodone immediate release (ROXICODONE) 10 MG immediate release tablet Take 10 mg by mouth every four hours as needed for Moderate Pain.     • morphine ER (MS CONTIN) 30 MG Tab CR tablet Take 30 mg by mouth every 8 hours as needed.     • tizanidine (ZANAFLEX) 4 MG Tab Take 8 mg by mouth 3 times a day as needed (muscle spasms).         Family History  History reviewed. No pertinent family history.    Social History  Social History   Substance Use Topics   • Smoking status: Passive Smoke Exposure - Never Smoker -- 2.00 packs/day for 14 years     Types: Cigarettes   • Smokeless tobacco: Never Used   • Alcohol Use: No      Comment: [facility]       Allergies  Allergies   Allergen Reactions   • Contrast Media With Iodine [Iodine] Itching     After CT with IV and oral contrast on 4/15/14   • Hydrocodone Itching     Rxn - 2011  Hands itch        Physical Exam  Laboratory   Hemodynamics  Temp (24hrs), Av.7 °C (101.7 °F), Min:37.3 °C (99.1 °F), Max:40.4 °C (104.8 °F)   Temperature: 37.3 °C (99.1 °F)  Pulse  Av.6  Min: 96  Max: 129 Heart Rate (Monitored): 97  Blood Pressure: (!) 97/62 mmHg (rn informed), NIBP: 100/51 mmHg      Respiratory      Respiration: 15, Pulse Oximetry: 99 % (placed on 2L for POONAM)        RUL Breath Sounds: Clear, RML Breath Sounds: Clear, KEDAR Breath Sounds: Clear,  LLL Breath Sounds: Clear    Physical Exam   Constitutional: She appears well-developed and well-nourished.   HENT:   Head: Normocephalic and atraumatic.   Eyes: Conjunctivae and EOM are normal. No scleral icterus.   Neck: Normal range of motion. Neck supple.   Cardiovascular: Normal rate and regular rhythm.  Exam reveals no gallop and no friction rub.    No murmur heard.  Pulmonary/Chest: Effort normal and breath sounds normal. No respiratory distress. She has no wheezes. She has no rales.   Abdominal: Soft. Bowel sounds are normal. She exhibits no distension. There is tenderness (superpubic pain out of proportion to exam.). There is no rebound and no guarding.   Musculoskeletal: She exhibits no edema or tenderness.   Neurological: She is alert.   Skin: Skin is warm.   Psychiatric: She has a normal mood and affect. Her behavior is normal.       Recent Labs      08/20/17 2153   WBC  7.8   RBC  4.57   HEMOGLOBIN  10.4*   HEMATOCRIT  32.9*   MCV  72.0*   MCH  22.8*   MCHC  31.6*   RDW  43.8   PLATELETCT  216   MPV  10.3     Recent Labs      08/20/17   2153   SODIUM  133*   POTASSIUM  3.7   CHLORIDE  104   CO2  21   GLUCOSE  105*   BUN  11   CREATININE  0.80   CALCIUM  8.3*     Recent Labs      08/20/17   2153   ALTSGPT  23   ASTSGOT  20   ALKPHOSPHAT  62   TBILIRUBIN  0.6   GLUCOSE  105*     Recent Labs      08/20/17   2227   APTT  27.7   INR  1.11             Lab Results   Component Value Date    TROPONINI <0.02 12/08/2016       Imaging  Dx-chest-portable (1 View)    8/20/2017 8/20/2017 10:10 PM HISTORY/REASON FOR EXAM: Shortness of breath TECHNIQUE/EXAM DESCRIPTION AND NUMBER OF VIEWS: Single portable view of the chest. COMPARISON: 12/12/2016 FINDINGS: There is again seen linear atelectasis versus scarring within the right middle lung. There are surgical clips within the right lung. There is no pleural effusion. The heart is normal in size.     8/20/2017  1.  Again seen scarring versus atelectasis within the right  "midlung. 2.  Otherwise no evidence of acute cardiopulmonary process.     Assessment/Plan     I anticipate this patient will require at least two midnights for appropriate medical management, necessitating inpatient admission.    * Pyelonephritis  Assessment & Plan  Presented with fever, chills, nausea, flank pain.  Does have a history of kidney stones and mentions that usually pass the stones.  Ordered renal ultrasound to evaluate ureters and kidneys  Pain control, bowel regimen, IV Rocephin, follow urine and blood cultures    Opioid dependence (CMS-HCC) (present on admission)  Assessment & Plan  On narcotics for chronic pain syndrome and degenerative joint disease.  On pain control anyway.    Degenerative joint disease (present on admission)  Assessment & Plan  History of. She tells me she will have hip replacement in the future. On narcotics.    Chronic pain syndrome (present on admission)  Assessment & Plan  Nonspecific, along with DJD she has a diagnosis of \"fibro-myalgia\". On pain medicines currently.    Sepsis (CMS-HCC)  Assessment & Plan  This is sepsis (without associated acute organ dysfunction).   Not hypotensive, normal lactic acid.  IV fluids, treat urinary source, follow blood cultures and urine cultures.  Resolved.      VTE prophylaxis: SCDs .    I spent 72 minutes evaluating Tiffany Gottlieb, reviewing the chart, vitals, labs and imaging, discussing the case with ED physician, medication reconciliation, placing orders and enacting the plan above.    Sections of this note have been dictated using Dragon Speech recognition software. While care and attention has been placed to ensure the accuracy of this note, there can be occasional typographical errors that may be missed and I apologize if this situation occurs. Please take these errors into context. If questions occur please do not hesitate to call or notify the author of this note for clarification.      CC Pcp Pt States None           "

## 2017-08-21 NOTE — ED NOTES
"Chief Complaint   Patient presents with   • Flank Pain     Pt c/o bilateral flank pain. Pt has history of kidney stones and states this \"attack\" has been ongoing for over one week.    • Blood in Sputum     Pt states she had \"half of her right lung removed\" in November 2016. Pt states she began coughing up blood yesterday     /61 mmHg  Pulse 129  Temp(Src) 40.4 °C (104.8 °F)  Resp 20  Ht 1.575 m (5' 2\")  Wt 83 kg (182 lb 15.7 oz)  BMI 33.46 kg/m2  SpO2 96%    "

## 2017-08-21 NOTE — PROGRESS NOTES
0100: Telephone report received from Ruchi BRUNER    0113: Patient arrived to unit from ED and is now in room 314-2, patient ambulated from gurney to bed with steady gait    0145: Admission profile, assessment and med rec updated, MD in to assess patient at bedside    0219: Patient complaining of 10/10 suprapubic and bilateral flank pain as well as nausea. Prn pain medication as well as antiemetic administered per MAR, patient provided with luis koch and denies any other needs or complaints at this time, safety precautions in place, CLIP    0343: Patient complaining of 10/10 pain, prn pain medication administered per MAR, patient denies any other needs or complaints at this time, IVF infusing without difficulty, safety precautions in place    0715: Bedside report given to Kaity BRUNER, patient laying comfortably in bed and denies any needs or complaints at this time

## 2017-08-21 NOTE — ASSESSMENT & PLAN NOTE
This is sepsis (without associated acute organ dysfunction).   Resolved, finding of bacteremia today from cultures obtained on admission.  ID following  Continue Rocephin

## 2017-08-22 LAB
ANION GAP SERPL CALC-SCNC: 3 MMOL/L (ref 0–11.9)
BUN SERPL-MCNC: <5 MG/DL (ref 8–22)
CALCIUM SERPL-MCNC: 8 MG/DL (ref 8.4–10.2)
CHLORIDE SERPL-SCNC: 112 MMOL/L (ref 96–112)
CO2 SERPL-SCNC: 23 MMOL/L (ref 20–33)
CREAT SERPL-MCNC: 0.7 MG/DL (ref 0.5–1.4)
ERYTHROCYTE [DISTWIDTH] IN BLOOD BY AUTOMATED COUNT: 47.6 FL (ref 35.9–50)
GFR SERPL CREATININE-BSD FRML MDRD: >60 ML/MIN/1.73 M 2
GLUCOSE SERPL-MCNC: 134 MG/DL (ref 65–99)
HCT VFR BLD AUTO: 31.6 % (ref 37–47)
HGB BLD-MCNC: 9.5 G/DL (ref 12–16)
MCH RBC QN AUTO: 22.1 PG (ref 27–33)
MCHC RBC AUTO-ENTMCNC: 30.1 G/DL (ref 33.6–35)
MCV RBC AUTO: 73.5 FL (ref 81.4–97.8)
PLATELET # BLD AUTO: 165 K/UL (ref 164–446)
PMV BLD AUTO: 10.4 FL (ref 9–12.9)
POTASSIUM SERPL-SCNC: 4.1 MMOL/L (ref 3.6–5.5)
RBC # BLD AUTO: 4.3 M/UL (ref 4.2–5.4)
SODIUM SERPL-SCNC: 138 MMOL/L (ref 135–145)
WBC # BLD AUTO: 6.4 K/UL (ref 4.8–10.8)

## 2017-08-22 PROCEDURE — A9270 NON-COVERED ITEM OR SERVICE: HCPCS | Performed by: INTERNAL MEDICINE

## 2017-08-22 PROCEDURE — 80048 BASIC METABOLIC PNL TOTAL CA: CPT

## 2017-08-22 PROCEDURE — 85027 COMPLETE CBC AUTOMATED: CPT

## 2017-08-22 PROCEDURE — 87040 BLOOD CULTURE FOR BACTERIA: CPT | Mod: 91

## 2017-08-22 PROCEDURE — 99232 SBSQ HOSP IP/OBS MODERATE 35: CPT | Performed by: HOSPITALIST

## 2017-08-22 PROCEDURE — 700111 HCHG RX REV CODE 636 W/ 250 OVERRIDE (IP): Performed by: INTERNAL MEDICINE

## 2017-08-22 PROCEDURE — 700105 HCHG RX REV CODE 258: Performed by: INTERNAL MEDICINE

## 2017-08-22 PROCEDURE — 770006 HCHG ROOM/CARE - MED/SURG/GYN SEMI*

## 2017-08-22 PROCEDURE — 700102 HCHG RX REV CODE 250 W/ 637 OVERRIDE(OP): Performed by: INTERNAL MEDICINE

## 2017-08-22 PROCEDURE — 700111 HCHG RX REV CODE 636 W/ 250 OVERRIDE (IP): Performed by: HOSPITALIST

## 2017-08-22 RX ORDER — LORAZEPAM 2 MG/ML
1 INJECTION INTRAMUSCULAR
Status: DISCONTINUED | OUTPATIENT
Start: 2017-08-22 | End: 2017-08-24 | Stop reason: HOSPADM

## 2017-08-22 RX ORDER — LORAZEPAM 2 MG/ML
1 INJECTION INTRAMUSCULAR ONCE
Status: COMPLETED | OUTPATIENT
Start: 2017-08-22 | End: 2017-08-22

## 2017-08-22 RX ADMIN — PHENAZOPYRIDINE HYDROCHLORIDE 200 MG: 200 TABLET ORAL at 10:08

## 2017-08-22 RX ADMIN — HYDROMORPHONE HYDROCHLORIDE 0.25 MG: 1 INJECTION, SOLUTION INTRAMUSCULAR; INTRAVENOUS; SUBCUTANEOUS at 02:17

## 2017-08-22 RX ADMIN — HYDROMORPHONE HYDROCHLORIDE 0.25 MG: 1 INJECTION, SOLUTION INTRAMUSCULAR; INTRAVENOUS; SUBCUTANEOUS at 20:43

## 2017-08-22 RX ADMIN — PROCHLORPERAZINE EDISYLATE 10 MG: 5 INJECTION INTRAMUSCULAR; INTRAVENOUS at 06:19

## 2017-08-22 RX ADMIN — LORAZEPAM 1 MG: 2 INJECTION INTRAMUSCULAR; INTRAVENOUS at 20:45

## 2017-08-22 RX ADMIN — CEFTRIAXONE 2 G: 2 INJECTION, POWDER, FOR SOLUTION INTRAMUSCULAR; INTRAVENOUS at 20:50

## 2017-08-22 RX ADMIN — HYDROMORPHONE HYDROCHLORIDE 0.25 MG: 1 INJECTION, SOLUTION INTRAMUSCULAR; INTRAVENOUS; SUBCUTANEOUS at 06:18

## 2017-08-22 RX ADMIN — LORAZEPAM 1 MG: 2 INJECTION INTRAMUSCULAR; INTRAVENOUS at 17:02

## 2017-08-22 RX ADMIN — OXYCODONE HYDROCHLORIDE 5 MG: 5 TABLET ORAL at 13:52

## 2017-08-22 RX ADMIN — BUTALBITAL, ACETAMINOPHEN, AND CAFFEINE 1 TABLET: 50; 325; 40 TABLET ORAL at 10:08

## 2017-08-22 RX ADMIN — OXYCODONE HYDROCHLORIDE 5 MG: 5 TABLET ORAL at 20:50

## 2017-08-22 RX ADMIN — HYDROMORPHONE HYDROCHLORIDE 0.25 MG: 1 INJECTION, SOLUTION INTRAMUSCULAR; INTRAVENOUS; SUBCUTANEOUS at 13:35

## 2017-08-22 RX ADMIN — OXYCODONE HYDROCHLORIDE 5 MG: 5 TABLET ORAL at 08:30

## 2017-08-22 RX ADMIN — HYDROMORPHONE HYDROCHLORIDE 0.25 MG: 1 INJECTION, SOLUTION INTRAMUSCULAR; INTRAVENOUS; SUBCUTANEOUS at 10:07

## 2017-08-22 RX ADMIN — SODIUM CHLORIDE: 9 INJECTION, SOLUTION INTRAVENOUS at 02:24

## 2017-08-22 RX ADMIN — SODIUM CHLORIDE: 9 INJECTION, SOLUTION INTRAVENOUS at 20:56

## 2017-08-22 RX ADMIN — PROCHLORPERAZINE EDISYLATE 10 MG: 5 INJECTION INTRAMUSCULAR; INTRAVENOUS at 15:52

## 2017-08-22 ASSESSMENT — PAIN SCALES - GENERAL
PAINLEVEL_OUTOF10: 9
PAINLEVEL_OUTOF10: 10
PAINLEVEL_OUTOF10: 8
PAINLEVEL_OUTOF10: 9
PAINLEVEL_OUTOF10: 7
PAINLEVEL_OUTOF10: 7
PAINLEVEL_OUTOF10: 8

## 2017-08-22 ASSESSMENT — ENCOUNTER SYMPTOMS
SHORTNESS OF BREATH: 0
BRUISES/BLEEDS EASILY: 0
COUGH: 0
WEIGHT LOSS: 0
CARDIOVASCULAR NEGATIVE: 1
FLANK PAIN: 0
FEVER: 0
DEPRESSION: 0
RESPIRATORY NEGATIVE: 1
GASTROINTESTINAL NEGATIVE: 1
SEIZURES: 1
VOMITING: 0
NECK PAIN: 1
ABDOMINAL PAIN: 0
LOSS OF CONSCIOUSNESS: 0
NAUSEA: 0
NERVOUS/ANXIOUS: 0
PSYCHIATRIC NEGATIVE: 1
CHILLS: 0
BACK PAIN: 1
DIZZINESS: 0
MYALGIAS: 1
WEAKNESS: 0

## 2017-08-22 NOTE — PROGRESS NOTES
Received bedside report from ZOHREH Danielle. Plan of care discussed. Safety precautions in place. Call light and personal belongings within reach. Patient has no needs at this time.

## 2017-08-22 NOTE — PROGRESS NOTES
Assessment completed, alert and oriented  to person ,place and time.On room air sat 92%. Complained of pain at pain level of 8/10 at this time.PRN and due medication given per MAR. States understanding of POC. No additional concern this time.

## 2017-08-22 NOTE — CARE PLAN
Problem: Safety  Goal: Will remain free from injury  Outcome: PROGRESSING AS EXPECTED  Hourly rounding.  Non-skid socks. Bed locked & in low position. Personal belongings within reach. .        Problem: Infection  Goal: Will remain free from infection  Outcome: PROGRESSING AS EXPECTED  Standard precaution in place. Educated  and encouraged regarding proper hand washing techniques.

## 2017-08-22 NOTE — PROGRESS NOTES
Bedside report given to  Haley BRUNER.Pt  Resting in the bed.Safety precautions in place and all the lines remain patent.

## 2017-08-22 NOTE — CARE PLAN
Problem: Pain Management  Goal: Pain level will decrease to patient’s comfort goal  Educate nonpharmacologic measures to reduce pain. Collaborate with patient to reduce pain with medication.    Problem: Urinary Elimination:  Goal: Ability to reestablish a normal urinary elimination pattern will improve  Outcome: PROGRESSING AS EXPECTED  Offer medication to reduce dysuria. Scheduled toileting with hourly rounding. Ambulate at least x3 today.

## 2017-08-22 NOTE — CONSULTS
INFECTIOUS DISEASES INPATIENT CONSULT NOTE     Date of Service: 08/22/17    Consult Requested By: Daja Vazquez M.D.    Reason for Consultation:  Gram negative sepsis    History of Present Illness:   Tiffany Gottlieb is a 41 y.o. Woman with a history of gastric bypass, chronic low back pain, fibromyalgia and h/o renal stones admitted 8/20/2017 for one week duration of dysuria. She initially developed dysuria associated with right flank pain, nausea and vomiting. The following day she felt euphoric and thought she may be having a grand mal seizure as she has a history of this. Soon thereafter she developed rigors and her  insisted she come to the hospital. Of note, she has a history of ureteral stents in the past but they have since been removed. She attributes her history of renal stones secondary to calcium vitamins she was started on after her gastric bypass several years ago. She eventually stopped taking these vitamins due to recurrent renal stones but her  bought her some multivitamins which also contain calcium. On presentation, she was febrile 104.8 without leukocytosis. Urinalysis reveals  WBCs, positive nitrite, moderate LE and many bacteria. Blood cultures are also now growing LFGNR. CT renal reveals minimal prominence of the right renal collecting system and right proximal ureter and a renal UA revealed a mild right hydronephrosis. She is currently on ceftriaxone. ID service was consulted for further abx recommendations.     Review Of Systems:  ROS are negative except as noted above in the HPI.    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   • Breath shortness      no inhalers no oxygen   • Psychiatric problem      anxiety and depression   • Arrhythmia      heart murmur when she was a kid   • History of gastric ulcer      ruptured gastric ulcer, punctured stomach while inpatient, 3 month stay         PSH:  Past Surgical History   Procedure Laterality Date   • Gastric bypass laparoscopic       2004   • Cholecystectomy     • Exploratory laparotomy  4/5/2011     Performed by BEAN YOU at SUR[facility]ERY [facility]   • Gastroscopy  4/17/2011     Performed by RASHI HOFFMAN at [facility]ERY [facility]   • Gastroscopy  4/28/2011     Performed by BEAN ANDRES at SUR[facility]ERY [facility]   • Gastrostomy laparoscopic  5/16/2011     Performed by TITI SELLERS at SUR[facility]ERY [facility]   • Primary c section     • Hysterectomy laparoscopy       partial, removed utereus, still has BSO   • Thoracotomy Right 11/14/2016     Procedure: THORACOTOMY;  Surgeon: John H Ganser, M.D.;  Location: [facility]ERY [facility];  Service:    • Lobectomy  11/14/2016     Procedure: LOBECTOMY middle   ;  Surgeon: John H Ganser, M.D.;  Location: SUR[facility]ERY [facility];  Service:    • Bronchoscopy  11/14/2016     Procedure: BRONCHOSCOPY  ;  Surgeon: John H Ganser, M.D.;  Location: SUR[facility]ERY [facility];  Service:        FAMILY HX:  Positive for nephrolithiasis (daughter)  Negative for DM2 or cancer    SOCIAL HX:  Social History     Social History   • Marital Status:      Spouse Name: N/A   • Number of Children: N/A   • Years of Education: N/A     Occupational History   • Not on file.     Social History Main Topics   • Smoking status: Passive Smoke Exposure - Never Smoker -- 2.00 packs/day for 14 years     Types: Cigarettes   • Smokeless tobacco: Never Used   • Alcohol Use: No      Comment: parents were both chain smokers, lived withthem for 14 years   • Drug Use: No   • Sexual Activity: Not on file     Other Topics Concern   •  Not on file     Social History Narrative     History   Smoking status   • Passive Smoke Exposure - Never Smoker -- 2.00 packs/day for 14 years   • Types: Cigarettes   Smokeless tobacco   • Never Used     History   Alcohol Use No     Comment: parents were both chain smokers, lived withthem for 14 years       Allergies/Intolerances:  Allergies   Allergen Reactions   • Contrast Media With Iodine [Iodine] Itching     After CT with IV and oral contrast on 4/15/14   • Hydrocodone Itching     Rxn - 2011  Hands itch       History reviewed with the patient    Other Current Medications:    Current facility-administered medications:   •  senna-docusate (PERICOLACE or SENOKOT S) 8.6-50 MG per tablet 2 Tab, 2 Tab, Oral, BID, 2 Tab at 08/21/17 0922 **AND** polyethylene glycol/lytes (MIRALAX) PACKET 1 Packet, 1 Packet, Oral, QDAY PRN **AND** magnesium hydroxide (MILK OF MAGNESIA) suspension 30 mL, 30 mL, Oral, QDAY PRN **AND** bisacodyl (DULCOLAX) suppository 10 mg, 10 mg, Rectal, QDAY PRN, Jayme Bonilla M.D.  •  NS infusion, , Intravenous, Continuous, Jayme Bonilla M.D., Last Rate: 125 mL/hr at 08/22/17 0224  •  acetaminophen (TYLENOL) tablet 650 mg, 650 mg, Oral, Q6HRS PRN, Jayme Bonilla M.D.  •  Notify provider if pain remains uncontrolled, , , CONTINUOUS **AND** Use the numeric rating scale (NRS-11) on regular floors and Critical-Care Pain Observation Tool (CPOT) on ICUs/Trauma to assess pain, , , CONTINUOUS **AND** Pulse Ox (Oximetry), , , CONTINUOUS **AND** Pharmacy Consult Request ...Pain Management Review, , Other, PRN **AND** If patient difficult to arouse and/or has respiratory depression, stop any opiates that are currently infusing and call a Rapid Response., , , CONTINUOUS **AND** oxycodone immediate-release (ROXICODONE) tablet 2.5 mg, 2.5 mg, Oral, Q3HRS PRN **AND** oxycodone immediate-release (ROXICODONE) tablet 5 mg, 5 mg, Oral, Q3HRS PRN, 5 mg at 08/22/17 0830 **AND** HYDROmorphone (DILAUDID) injection 0.25  "mg, 0.25 mg, Intravenous, Q3HRS PRN, Jayme Bonilla M.D., 0.25 mg at 17 1007  •  NS (BOLUS) infusion 500 mL, 500 mL, Intravenous, Once PRN, Jayme Bonilla M.D.  •  cefTRIAXone (ROCEPHIN) 2 g in  mL IVPB, 2 g, Intravenous, Q24HRS, Jayme Bonilla M.D., Stopped at 17  •  prochlorperazine (COMPAZINE) injection 10 mg, 10 mg, Intravenous, Q6HRS PRN, Jayme Bonilla M.D., 10 mg at 17 0619  •  phenazopyridine (PYRIDIUM) tablet 200 mg, 200 mg, Oral, TID PRN, Jayme Bonilla M.D., 200 mg at 17 1008  •  acetaminophen/caffeine/butalbital 325-40-50 mg (FIORICET) -40 MG per tablet 1 Tab, 1 Tab, Oral, Q6HRS PRN, Jayme Bonilla M.D., 1 Tab at 17 1008  [unfilled]    Most Recent Vital Signs:  BP 90/31 mmHg  Pulse 49  Temp(Src) 37.1 °C (98.8 °F)  Resp 18  Ht 1.575 m (5' 2.01\")  Wt 85 kg (187 lb 6.3 oz)  BMI 34.27 kg/m2  SpO2 94%  Breastfeeding? No  Temp  Av.9 °C (100.2 °F)  Min: 37.1 °C (98.8 °F)  Max: 40.4 °C (104.8 °F)    Physical Exam:  General: well-appearing, no acute distress  HEENT: sclera anicteric, PERRL, EOMI, MMM, no oral lesions  Neck: supple, no lymphadenopathy  Chest: CTAB, no r/r/w, normal work of breathing.  Cardiac: RRR, normal S1 S2, no m/r/g   Abdomen: + bowel sounds, soft, right sided abdominal tenderness to palpation, non-distended, no HSM, midline surgical scar well healed  Extremities: WWP, no edema, 2+ pulses  Skin: no rashes or worrisome lesions  Neuro: Alert and oriented times 3, non-focal exam    Pertinent Lab Results:  Recent Labs      17   0528   WBC  7.8  6.4      Recent Labs      17   0528   HEMOGLOBIN  10.4*  9.5*   HEMATOCRIT  32.9*  31.6*   MCV  72.0*  73.5*   MCH  22.8*  22.1*   PLATELETCT  216  165         Recent Labs      17   0528   SODIUM  133*  138   POTASSIUM  3.7  4.1   CHLORIDE  104  112   CO2  21  23   CREATININE  0.80  0.70        Recent Labs      " "08/20/17 2153   ALBUMIN  3.3        Pertinent Micro:  Results     Procedure Component Value Units Date/Time    BLOOD CULTURE [[id number]] Collected:  08/22/17 1310    Order Status:  Completed Specimen Information:  Blood from Peripheral Updated:  08/22/17 1321    Narrative:      Per Hospital Policy: Only change Specimen Src: to \"Line\" if  specified by physician order.    BLOOD CULTURE [[id number]] Collected:  08/22/17 1304    Order Status:  Completed Specimen Information:  Blood from Peripheral Updated:  08/22/17 1321    Narrative:      Per Hospital Policy: Only change Specimen Src: to \"Line\" if  specified by physician order.    BLOOD CULTURE [[id number]]  (Abnormal) Collected:  08/20/17 2202    Order Status:  Completed Specimen Information:  Blood from Peripheral Updated:  08/22/17 0714     Significant Indicator POS (POS)      Source BLD      Site PERIPHERAL      Blood Culture -- (A)      Result:        Blood culture testing and Gram stain, if indicated, are  performed at [address]  Positive blood cultures are  sent to [facility], [address] [place] [address], for organism identification and  susceptibility testing.  Growth detected by Bactec instrument.       Blood Culture Lactose fermenting Gram negative angelina (A)     Narrative:      CALL  [facility]  MED tel. [phone],  CALLED  MED tel. [phone] 08/21/2017, 12:36, RB PERF. RESULTS CALLED  TO:[phone] rn  Per Hospital Policy: Only change Specimen Src: to \"Line\" if  specified by physician order.    BLOOD CULTURE [[id number]] Collected:  08/20/17 2153    Order Status:  Completed Specimen Information:  Blood from Peripheral Updated:  08/21/17 0737     Significant Indicator NEG      Source BLD      Site PERIPHERAL      Blood Culture --      Result:        No Growth    Note: Blood cultures are incubated for 5 days and  are monitored continuously.Positive blood cultures  are called to the RN " "and reported as soon as  they are identified.  Blood culture testing and Gram stain, if indicated, are  performed at [facility], [facility]  [facility].[place], [place].  Positive blood cultures are  sent to [facility], [address] [place] [address], for organism identification and  susceptibility testing.      Narrative:      Per Hospital Policy: Only change Specimen Src: to \"Line\" if  specified by physician order.    Urinalysis [[id number]] Collected:  08/21/17 0000    Order Status:  Canceled Specimen Information:  Urine     Narrative:      ORDER WAS CANCELLED 08/21/2017 01:18, Duplicate . 08/21/2017  01:18.  If not done within the last 24 hours    URINALYSIS [[id number]]  (Abnormal) Collected:  08/20/17 2215    Order Status:  Completed Specimen Information:  Urine Updated:  08/20/17 2313     Color Straw      Comment: Corrected result; previously reported as Yellow on 08/20/17 at 22:56        Character Hazy (A)      Specific Gravity 1.010      Ph 5.5      Glucose Negative mg/dL      Ketones Negative mg/dL      Protein 30 (A) mg/dL      Bilirubin Negative      Nitrite Positive (A)      Leukocyte Esterase Moderate (A)      Occult Blood Large (A)      Micro Urine Req Microscopic     Narrative:      Indication for culture:->Emergency Room Patient    URINE CULTURE(NEW) [[id number]] Collected:  08/20/17 2215    Order Status:  Completed Specimen Information:  Urine Updated:  08/20/17 2171    Narrative:      Indication for culture:->Emergency Room Patient        BLOOD CULTURE   Date Value Ref Range Status   08/20/2017 *  Preliminary    Blood culture testing and Gram stain, if indicated, are  performed at [facility], [facility]  [facility].[place], [place].  Positive blood cultures are  sent to [facility], [address] [place] [address], for organism identification and  susceptibility testing.  Growth detected by Bactec " instrument.     08/20/2017 Lactose fermenting Gram negative angelina*  Preliminary        Studies:  CT renal reviewed with results noted in the HPI  US mild right hydronephrosis    IMPRESSION:   1. Gram negative sepsis  2. Pyelonephritis  3. H/o nephrolithiasis    PLAN:   Tiffany Gottlieb is a 41 y.o. Woman with a history of gastric bypass, and nephrolithiasis s/p ureteral stents in the past admitted for dysuria, right flank pain, fevers and chills found to have gram negative sepsis secondary to urinary source and possible nephrolithiasis. Continue ceftriaxone for now pending further blood cultures and patient's symptoms are improving. Repeat blood cultures obtained to document clearance. Further recommendations per clinical course.       Plan of care discussed with BO Vazquez M.D.. Will continue to follow    Toyin Massey M.D.

## 2017-08-23 PROBLEM — D50.9 IRON DEFICIENCY ANEMIA: Status: ACTIVE | Noted: 2017-08-23

## 2017-08-23 LAB
BACTERIA BLD CULT: ABNORMAL
BACTERIA BLD CULT: ABNORMAL
BACTERIA UR CULT: ABNORMAL
BACTERIA UR CULT: ABNORMAL
SIGNIFICANT IND 70042: ABNORMAL
SIGNIFICANT IND 70042: ABNORMAL
SITE SITE: ABNORMAL
SITE SITE: ABNORMAL
SOURCE SOURCE: ABNORMAL
SOURCE SOURCE: ABNORMAL

## 2017-08-23 PROCEDURE — 770006 HCHG ROOM/CARE - MED/SURG/GYN SEMI*

## 2017-08-23 PROCEDURE — 94760 N-INVAS EAR/PLS OXIMETRY 1: CPT

## 2017-08-23 PROCEDURE — 700102 HCHG RX REV CODE 250 W/ 637 OVERRIDE(OP): Performed by: HOSPITALIST

## 2017-08-23 PROCEDURE — 99232 SBSQ HOSP IP/OBS MODERATE 35: CPT | Performed by: HOSPITALIST

## 2017-08-23 PROCEDURE — 700111 HCHG RX REV CODE 636 W/ 250 OVERRIDE (IP): Performed by: HOSPITALIST

## 2017-08-23 PROCEDURE — 700102 HCHG RX REV CODE 250 W/ 637 OVERRIDE(OP): Performed by: INTERNAL MEDICINE

## 2017-08-23 PROCEDURE — 700105 HCHG RX REV CODE 258: Performed by: INTERNAL MEDICINE

## 2017-08-23 PROCEDURE — A9270 NON-COVERED ITEM OR SERVICE: HCPCS | Performed by: HOSPITALIST

## 2017-08-23 PROCEDURE — 700111 HCHG RX REV CODE 636 W/ 250 OVERRIDE (IP): Performed by: INTERNAL MEDICINE

## 2017-08-23 PROCEDURE — 95951 EEG: CPT | Mod: 52

## 2017-08-23 PROCEDURE — A9270 NON-COVERED ITEM OR SERVICE: HCPCS | Performed by: INTERNAL MEDICINE

## 2017-08-23 RX ORDER — MORPHINE SULFATE 30 MG/1
30 TABLET, FILM COATED, EXTENDED RELEASE ORAL EVERY 8 HOURS
Status: DISCONTINUED | OUTPATIENT
Start: 2017-08-23 | End: 2017-08-24 | Stop reason: HOSPADM

## 2017-08-23 RX ORDER — TIZANIDINE 4 MG/1
8 TABLET ORAL 3 TIMES DAILY PRN
Status: DISCONTINUED | OUTPATIENT
Start: 2017-08-23 | End: 2017-08-24 | Stop reason: HOSPADM

## 2017-08-23 RX ORDER — OXYCODONE HYDROCHLORIDE 10 MG/1
10 TABLET ORAL EVERY 4 HOURS PRN
Status: DISCONTINUED | OUTPATIENT
Start: 2017-08-23 | End: 2017-08-24 | Stop reason: HOSPADM

## 2017-08-23 RX ADMIN — SODIUM CHLORIDE: 9 INJECTION, SOLUTION INTRAVENOUS at 05:39

## 2017-08-23 RX ADMIN — LORAZEPAM 1 MG: 2 INJECTION INTRAMUSCULAR; INTRAVENOUS at 13:05

## 2017-08-23 RX ADMIN — HYDROMORPHONE HYDROCHLORIDE 0.25 MG: 1 INJECTION, SOLUTION INTRAMUSCULAR; INTRAVENOUS; SUBCUTANEOUS at 00:45

## 2017-08-23 RX ADMIN — BUTALBITAL, ACETAMINOPHEN, AND CAFFEINE 1 TABLET: 50; 325; 40 TABLET ORAL at 16:32

## 2017-08-23 RX ADMIN — HYDROMORPHONE HYDROCHLORIDE 0.25 MG: 1 INJECTION, SOLUTION INTRAMUSCULAR; INTRAVENOUS; SUBCUTANEOUS at 15:24

## 2017-08-23 RX ADMIN — OXYCODONE HYDROCHLORIDE 10 MG: 10 TABLET ORAL at 20:09

## 2017-08-23 RX ADMIN — OXYCODONE HYDROCHLORIDE 5 MG: 5 TABLET ORAL at 05:34

## 2017-08-23 RX ADMIN — PROCHLORPERAZINE EDISYLATE 10 MG: 5 INJECTION INTRAMUSCULAR; INTRAVENOUS at 00:43

## 2017-08-23 RX ADMIN — HYDROMORPHONE HYDROCHLORIDE 0.25 MG: 1 INJECTION, SOLUTION INTRAMUSCULAR; INTRAVENOUS; SUBCUTANEOUS at 21:14

## 2017-08-23 RX ADMIN — TIZANIDINE 8 MG: 4 TABLET ORAL at 22:12

## 2017-08-23 RX ADMIN — MORPHINE SULFATE 30 MG: 30 TABLET, EXTENDED RELEASE ORAL at 15:20

## 2017-08-23 RX ADMIN — MORPHINE SULFATE 30 MG: 30 TABLET, EXTENDED RELEASE ORAL at 22:12

## 2017-08-23 RX ADMIN — HYDROMORPHONE HYDROCHLORIDE 0.25 MG: 1 INJECTION, SOLUTION INTRAMUSCULAR; INTRAVENOUS; SUBCUTANEOUS at 09:47

## 2017-08-23 RX ADMIN — HYDROMORPHONE HYDROCHLORIDE 0.25 MG: 1 INJECTION, SOLUTION INTRAMUSCULAR; INTRAVENOUS; SUBCUTANEOUS at 05:32

## 2017-08-23 RX ADMIN — PHENAZOPYRIDINE HYDROCHLORIDE 200 MG: 200 TABLET ORAL at 09:47

## 2017-08-23 RX ADMIN — OXYCODONE HYDROCHLORIDE 5 MG: 5 TABLET ORAL at 09:47

## 2017-08-23 RX ADMIN — CEFTRIAXONE 2 G: 2 INJECTION, POWDER, FOR SOLUTION INTRAMUSCULAR; INTRAVENOUS at 20:09

## 2017-08-23 RX ADMIN — OXYCODONE HYDROCHLORIDE 5 MG: 5 TABLET ORAL at 00:46

## 2017-08-23 RX ADMIN — PROCHLORPERAZINE EDISYLATE 10 MG: 5 INJECTION INTRAMUSCULAR; INTRAVENOUS at 16:32

## 2017-08-23 ASSESSMENT — ENCOUNTER SYMPTOMS
SEIZURES: 0
SHORTNESS OF BREATH: 0
GASTROINTESTINAL NEGATIVE: 1
RESPIRATORY NEGATIVE: 1
DIZZINESS: 0
WEAKNESS: 0
CHILLS: 0
NERVOUS/ANXIOUS: 0
NAUSEA: 0
VOMITING: 0
HEADACHES: 0
BRUISES/BLEEDS EASILY: 0
FEVER: 0
WEIGHT LOSS: 0
FLANK PAIN: 1
MYALGIAS: 0
ABDOMINAL PAIN: 0
CARDIOVASCULAR NEGATIVE: 1
COUGH: 0
NECK PAIN: 1
BACK PAIN: 1
NAUSEA: 1
LOSS OF CONSCIOUSNESS: 0
DIARRHEA: 0
PSYCHIATRIC NEGATIVE: 1
FLANK PAIN: 0
DEPRESSION: 0

## 2017-08-23 ASSESSMENT — PAIN SCALES - GENERAL
PAINLEVEL_OUTOF10: 10
PAINLEVEL_OUTOF10: 9
PAINLEVEL_OUTOF10: 8
PAINLEVEL_OUTOF10: 10
PAINLEVEL_OUTOF10: 9
PAINLEVEL_OUTOF10: 9

## 2017-08-23 NOTE — PROGRESS NOTES
RN in room with another pt. Alerted by Nursing Coord that patient had left the floor. Pt found in cafeteria with daughter attempting to purchase food. Cafeteria staff let pt know that patients needed to order food from pt menu via room phone d/t possible dietary restrictions, etc. per policy. RN requested pt return to room and room service menu would be provided along with assistance to order what she wanted. Pt became angry and upset with raised voice stating she had told staff that she was going to the cafeteria and they told her she could. RN explained to pt that d/t history of seizures and current illness it was not advised that she leave the floor without supervision/accompaniment of medical staff. Pt returned to room accompanied by RN. Pt remained anxious and upset, stating she wants to leave AMA. RN advised pt to stay and provided reasons why. Pt agreeing to stay at this time. Call placed to kitchen for special food order. Daughter at bedside.

## 2017-08-23 NOTE — PROGRESS NOTES
Gave report to ZOHREH Danielle. Plan of care discussed. Safety precautions in place. Personal belongings and call light with in reach. Patient has no other needs at this time.

## 2017-08-23 NOTE — PROGRESS NOTES
Assessment completed, alert and oriented  to person ,place and time.On room air sat 98%.Complained of at pain 8/10 at this time.PRN pain and due medication given per MAR. States understanding of POC. No additional concern this time.

## 2017-08-23 NOTE — PROGRESS NOTES
Infectious Disease Progress Note    Author: Toyin Massey M.D. Date & Time of service: 2017  9:07 AM    Chief Complaint:  Dysuria and right flank pain    FU gram negative sepsis    Interval History:   AF, no CBC, continues to have dysuria and R flank pain, no diarrhea, tolerating abx without issues  Labs Reviewed, Medications Reviewed, Radiology Reviewed and Wound Reviewed.    Review of Systems:  Review of Systems   Constitutional: Negative for fever and chills.   Respiratory: Negative for shortness of breath.    Gastrointestinal: Positive for nausea. Negative for abdominal pain and diarrhea.   Genitourinary: Positive for dysuria and flank pain. Negative for hematuria.   Neurological: Negative for headaches.       Hemodynamics:  Temp (24hrs), Av.1 °C (98.7 °F), Min:36.9 °C (98.4 °F), Max:37.3 °C (99.2 °F)  Temperature: 37 °C (98.6 °F)  Pulse  Av.3  Min: 41  Max: 129   Blood Pressure: 115/59 mmHg       Physical Exam:  Physical Exam   Constitutional: She is oriented to person, place, and time. She appears well-developed.   HENT:   Head: Normocephalic and atraumatic.   Eyes: EOM are normal. Pupils are equal, round, and reactive to light.   Neck: Neck supple.   Cardiovascular: Normal rate, regular rhythm and normal heart sounds.    Pulmonary/Chest: Effort normal and breath sounds normal.   Abdominal: Soft. There is tenderness.   R CVA tenderness    Mid line abdominal surgical scar well healed   Musculoskeletal: Normal range of motion. She exhibits no edema.   Neurological: She is alert and oriented to person, place, and time.   Skin: No rash noted.       Meds:    Current facility-administered medications:   •  lorazepam  •  senna-docusate **AND** polyethylene glycol/lytes **AND** magnesium hydroxide **AND** bisacodyl  •  NS  •  acetaminophen  •  Notify provider if pain remains uncontrolled **AND** Use the numeric rating scale (NRS-11) on regular floors and Critical-Care Pain Observation Tool  (CPOT) on ICUs/Trauma to assess pain **AND** Pulse Ox (Oximetry) **AND** Pharmacy Consult Request **AND** If patient difficult to arouse and/or has respiratory depression, stop any opiates that are currently infusing and call a Rapid Response. **AND** oxycodone immediate-release **AND** oxycodone immediate-release **AND** HYDROmorphone  •  NS  •  cefTRIAXone (ROCEPHIN) IVPB  •  prochlorperazine  •  phenazopyridine  •  acetaminophen/caffeine/butalbital 325-40-50 mg    Labs:  Recent Labs      08/20/17 2153 08/22/17   0528   WBC  7.8  6.4   RBC  4.57  4.30   HEMOGLOBIN  10.4*  9.5*   HEMATOCRIT  32.9*  31.6*   MCV  72.0*  73.5*   MCH  22.8*  22.1*   RDW  43.8  47.6   PLATELETCT  216  165   MPV  10.3  10.4   NEUTSPOLYS  89.60*   --    LYMPHOCYTES  7.30*   --    MONOCYTES  2.40   --    EOSINOPHILS  0.30   --    BASOPHILS  0.10   --      Recent Labs      08/20/17 2153 08/22/17   0528   SODIUM  133*  138   POTASSIUM  3.7  4.1   CHLORIDE  104  112   CO2  21  23   GLUCOSE  105*  134*   BUN  11  <5*     Recent Labs      08/20/17 2153 08/22/17   0528   ALBUMIN  3.3   --    TBILIRUBIN  0.6   --    ALKPHOSPHAT  62   --    TOTPROTEIN  6.2   --    ALTSGPT  23   --    ASTSGOT  20   --    CREATININE  0.80  0.70       Imaging:  Ct-renal Colic Evaluation(a/p W/o)    8/21/2017 8/21/2017 8:03 AM HISTORY/REASON FOR EXAM:  Lower abdominal pain and history of calculi TECHNIQUE/EXAM DESCRIPTION AND NUMBER OF VIEWS: CT scan renal/colic without contrast. 5 mm helical images of the abdomen and pelvis were obtained from the diaphragmatic domes through the pubic symphysis. Low dose optimization technique was utilized for this CT exam including automated exposure control and adjustment of the mA and/or kV according to patient size. Comparison: 12/08/2016, 7/31/2016 FINDINGS: Abdomen: There is mild prominence of right renal collecting system and proximal right ureter. No ureteral calcifications are appreciated. Minimal linear atelectasis  is noted in the left lung base. No large masses are seen within the upper abdomen. Postoperative changes are noted in the stomach. Surgical clips are noted in the right upper quadrant. Pelvis: No urinary tract calcifications or evidence of obstruction are identified in the pelvis. There is no evidence of inflammatory change seen in the region of the bowel. Multiple bilateral pelvic calcifications are identified which appear vascular and unchanged compared to prior CT. Left adnexal cyst measuring 3.5 cm is again noted and unchanged.     8/21/2017  1.  THERE IS MINIMAL PROMINENCE OF RIGHT RENAL COLLECTING SYSTEM AND RIGHT PROXIMAL URETER. HOWEVER, NO OBSTRUCTING CALCULUS IS CURRENTLY IDENTIFIED. RECENTLY PASSED CALCULUS IS A POSSIBILITY. 2.  MULTIPLE PELVIC CALCIFICATIONS ARE IDENTIFIED WHICH ARE UNCHANGED AND LIKELY VASCULAR. 3.  3.5 CM LEFT ADNEXAL CYST IS AGAIN IDENTIFIED. 4.  POSTOPERATIVE CHANGES AGAIN NOTED IN THE UPPER ABDOMEN.     Dx-chest-portable (1 View)    8/20/2017 8/20/2017 10:10 PM HISTORY/REASON FOR EXAM: Shortness of breath TECHNIQUE/EXAM DESCRIPTION AND NUMBER OF VIEWS: Single portable view of the chest. COMPARISON: 12/12/2016 FINDINGS: There is again seen linear atelectasis versus scarring within the right middle lung. There are surgical clips within the right lung. There is no pleural effusion. The heart is normal in size.     8/20/2017  1.  Again seen scarring versus atelectasis within the right midlung. 2.  Otherwise no evidence of acute cardiopulmonary process.    Us-renal    8/21/2017 8/21/2017 6:08 AM HISTORY/REASON FOR EXAM:  Bilateral flank pain. TECHNIQUE/EXAM DESCRIPTION: Renal ultrasound. COMPARISON:  None FINDINGS: The right kidney measures 11.29 cm. The left kidney measures 10.93 cm. There is mild right hydronephrosis. There are no abnormal calcifications. The bladder demonstrates no focal wall abnormality. Bilateral ureteral jets are seen.     8/21/2017  1.  Mild right  "hydronephrosis. 2.  No evidence of solid renal mass.      Micro:  Results     Procedure Component Value Units Date/Time    BLOOD CULTURE [[id number]]  (Abnormal)  (Susceptibility) Collected:  08/20/17 2202    Order Status:  Completed Specimen Information:  Blood from Peripheral Updated:  08/23/17 0857     Significant Indicator POS (POS)      Source BLD      Site PERIPHERAL      Blood Culture -- (A)      Result:        Blood culture testing and Gram stain, if indicated, are  performed at [facility], [address] [place] [address].  Positive blood cultures are  sent to [facility], [address] [place] [address], for organism identification and  susceptibility testing.  Growth detected by Bactec instrument.       Blood Culture Escherichia coli (A)     Narrative:      CALL  [facility]  MED tel. [phone],  CALLED  MED tel. [phone] 08/21/2017, 12:36, RB PERF. RESULTS CALLED  TO:[id number] rn  Per Hospital Policy: Only change Specimen Src: to \"Line\" if  specified by physician order.    Culture & Susceptibility     ESCHERICHIA COLI     Antibiotic Sensitivity Microscan Unit Status    Ampicillin Sensitive <=8 mcg/mL Final    Cefepime Sensitive <=8 mcg/mL Final    Cefotaxime Sensitive <=2 mcg/mL Final    Cefotetan Sensitive <=16 mcg/mL Final    Ceftazidime Sensitive <=1 mcg/mL Final    Ceftriaxone Sensitive <=8 mcg/mL Final    Cefuroxime Sensitive <=4 mcg/mL Final    Ciprofloxacin Sensitive <=1 mcg/mL Final    Ertapenem Sensitive <=1 mcg/mL Final    Gentamicin Sensitive <=4 mcg/mL Final    Pip/Tazobactam Sensitive <=16 mcg/mL Final    Tigecycline Sensitive <=2 mcg/mL Final    Tobramycin Sensitive <=4 mcg/mL Final    Trimeth/Sulfa Sensitive <=2/38 mcg/mL Final                       BLOOD CULTURE [[id number]] Collected:  08/22/17 1310    Order Status:  Completed Specimen Information:  Blood from Peripheral Updated:  08/23/17 0836     Significant Indicator NEG      Source BLD      Site " "PERIPHERAL      Blood Culture --      Result:        No Growth    Note: Blood cultures are incubated for 5 days and  are monitored continuously.Positive blood cultures  are called to the RN and reported as soon as  they are identified.      Narrative:      Per Hospital Policy: Only change Specimen Src: to \"Line\" if  specified by physician order.    BLOOD CULTURE [[id number]] Collected:  08/22/17 1304    Order Status:  Completed Specimen Information:  Blood from Peripheral Updated:  08/23/17 0836     Significant Indicator NEG      Source BLD      Site PERIPHERAL      Blood Culture --      Result:        No Growth    Note: Blood cultures are incubated for 5 days and  are monitored continuously.Positive blood cultures  are called to the RN and reported as soon as  they are identified.      Narrative:      Per Hospital Policy: Only change Specimen Src: to \"Line\" if  specified by physician order.    URINE CULTURE(NEW) [[id number]]  (Abnormal) Collected:  08/20/17 2215    Order Status:  Completed Specimen Information:  Urine Updated:  08/22/17 1423     Significant Indicator POS (POS)      Source UR      Site --      Urine Culture -- (A)      Urine Culture -- (A)      Result:        Lactose fermenting Gram negative angelina  >100,000 cfu/mL      Narrative:      Indication for culture:->Emergency Room Patient    BLOOD CULTURE [[id number]] Collected:  08/20/17 2153    Order Status:  Completed Specimen Information:  Blood from Peripheral Updated:  08/21/17 0737     Significant Indicator NEG      Source BLD      Site PERIPHERAL      Blood Culture --      Result:        No Growth    Note: Blood cultures are incubated for 5 days and  are monitored continuously.Positive blood cultures  are called to the RN and reported as soon as  they are identified.  Blood culture testing and Gram stain, if indicated, are  performed at [facility], [address] [place] [address].  Positive blood cultures are  sent to " "[facility], [address] [address], for organism identification and  susceptibility testing.      Narrative:      Per Hospital Policy: Only change Specimen Src: to \"Line\" if  specified by physician order.    Urinalysis [[id number]] Collected:  08/21/17 0000    Order Status:  Canceled Specimen Information:  Urine     Narrative:      ORDER WAS CANCELLED 08/21/2017 01:18, Duplicate . 08/21/2017  01:18.  If not done within the last 24 hours    URINALYSIS [[id number]]  (Abnormal) Collected:  08/20/17 2215    Order Status:  Completed Specimen Information:  Urine Updated:  08/20/17 2313     Color Straw      Comment: Corrected result; previously reported as Yellow on 08/20/17 at 22:56        Character Hazy (A)      Specific Gravity 1.010      Ph 5.5      Glucose Negative mg/dL      Ketones Negative mg/dL      Protein 30 (A) mg/dL      Bilirubin Negative      Nitrite Positive (A)      Leukocyte Esterase Moderate (A)      Occult Blood Large (A)      Micro Urine Req Microscopic     Narrative:      Indication for culture:->Emergency Room Patient          Assessment:  Active Hospital Problems    Diagnosis   • *Pyelonephritis [N12]   • Sepsis (CMS-HCC) [A41.9]   • Chronic pain syndrome [G89.4]   • Degenerative joint disease [M19.90]   • Opioid dependence (CMS-HCC) [F11.20]   • Seizure disorder (CMS-HCC) [G40.909]       Plan:  Gram negative sepsis  2/2 urinary source  Fevers resolved  No leukocytosis  Bcx - E coli  Ucx - LFGNR  Continue ceftriaxone for now  Repeat Bcx 8/22 - NGTD so far  Anticipate 2 weeks of abx from 8/22.  Anticipate transition to PO cipro tomorrow if clinically improving    Right pyelonephritis  Still having flank pain  Abx as per above    H/o nephrolithiasis  Possibly passed renal stone on renal CT    Discussed with internal medicine/Dr. Vazquez  "

## 2017-08-23 NOTE — PROGRESS NOTES
[facility] Hospitalist Progress Note    Date of Service: 2017    Chief Complaint  41 y.o. female admitted 2017 with acute pyelonephritis    Interval Problem Update  Blood cultures growing lactose fermenting gram-negative rods today. Patient complains this afternoon of clenching teeth and her tongue listing to one side. She states that this has happened in the past before she had a seizure. She has stopped taking Dilantin which she was on in the past.  She states her blood pressures chronically run from 80s to 90s. She is not dizzy and is asymptomatic.    Consultants/Specialty  Infectious disease    Disposition  Home when medically cleared        Review of Systems   Constitutional: Positive for malaise/fatigue. Negative for fever, chills and weight loss.   Respiratory: Negative.  Negative for cough and shortness of breath.    Cardiovascular: Negative.  Negative for chest pain and leg swelling.   Gastrointestinal: Negative.  Negative for nausea, vomiting and abdominal pain.   Genitourinary: Negative.  Negative for dysuria and flank pain.   Musculoskeletal: Positive for myalgias, back pain, joint pain and neck pain.   Neurological: Positive for seizures. Negative for dizziness, loss of consciousness and weakness.   Endo/Heme/Allergies: Negative.  Does not bruise/bleed easily.   Psychiatric/Behavioral: Negative.  Negative for depression. The patient is not nervous/anxious.    All other systems reviewed and are negative.     Physical Exam  Laboratory/Imaging   Hemodynamics  Temp (24hrs), Av.2 °C (99 °F), Min:37.1 °C (98.8 °F), Max:37.3 °C (99.2 °F)   Temperature: 37.3 °C (99.2 °F)  Pulse  Av.4  Min: 41  Max: 129    Blood Pressure: (!) 89/52 mmHg (rn aware)      Respiratory      Respiration: 18, Pulse Oximetry: 100 %        RUL Breath Sounds: Clear, RML Breath Sounds: Clear, KEDAR Breath Sounds: Clear, LLL Breath Sounds: Clear    Fluids    Intake/Output Summary (Last 24 hours) at 17  Last data  filed at 08/22/17 1739   Gross per 24 hour   Intake    591 ml   Output   1975 ml   Net  -1384 ml       Nutrition  Orders Placed This Encounter   Procedures   • Diet Order     Standing Status: Standing      Number of Occurrences: 1      Standing Expiration Date:      Order Specific Question:  Diet:     Answer:  Regular [1]     Physical Exam   Constitutional: She appears well-developed and well-nourished. No distress.   HENT:   Nose: Nose normal.   Mouth/Throat: Oropharynx is clear and moist. No oropharyngeal exudate.   Eyes: Conjunctivae are normal. Right eye exhibits no discharge. Left eye exhibits no discharge. No scleral icterus.   Neck: No JVD present. No tracheal deviation present.   Cardiovascular: Normal rate, regular rhythm and normal heart sounds.    Pulmonary/Chest: Effort normal and breath sounds normal. No stridor. No respiratory distress. She has no wheezes. She has no rales. She exhibits no tenderness.   Abdominal: Soft. Bowel sounds are normal. She exhibits no distension. There is no tenderness.   Musculoskeletal: She exhibits no edema or tenderness.   Neurological: She is alert. No cranial nerve deficit. She exhibits normal muscle tone.   Skin: Skin is warm and dry. She is not diaphoretic. No pallor.   Psychiatric: She has a normal mood and affect. Her behavior is normal.   Nursing note and vitals reviewed.      Recent Labs      08/20/17 2153 08/22/17   0528   WBC  7.8  6.4   RBC  4.57  4.30   HEMOGLOBIN  10.4*  9.5*   HEMATOCRIT  32.9*  31.6*   MCV  72.0*  73.5*   MCH  22.8*  22.1*   MCHC  31.6*  30.1*   RDW  43.8  47.6   PLATELETCT  216  165   MPV  10.3  10.4     Recent Labs      08/20/17 2153 08/22/17   0528   SODIUM  133*  138   POTASSIUM  3.7  4.1   CHLORIDE  104  112   CO2  21  23   GLUCOSE  105*  134*   BUN  11  <5*   CREATININE  0.80  0.70   CALCIUM  8.3*  8.0*     Recent Labs      08/20/17 2227   APTT  27.7   INR  1.11                  Assessment/Plan     * Pyelonephritis (present on  "admission)  Assessment & Plan  With positive blood cultures lactose fermenting gram-negative angelina, infectious disease consulted today for recommendations for management.  Presented with fever, chills, nausea, suprapubic pain and dysuria.  Renal ultrasound shows some mild right hydronephrosis consistent with a recently passed stone  Pain control, bowel regimen, IV Rocephin, follow blood cultures for sensitivities    Seizure disorder (CMS-HCC) (present on admission)  Assessment & Plan  Discharge history of being on Dilantin but has been taking it for some time. Complaining of clench teeth today give Ativan and observe, may need to be started on some other type of antiepileptic. EEG ordered.    Opioid dependence (CMS-HCC) (present on admission)  Assessment & Plan  On narcotics for chronic pain syndrome and degenerative joint disease. No change in regimen needed today  On pain control anyway.    Degenerative joint disease (present on admission)  Assessment & Plan  By history, on chronic narcotic pain management awaiting hip surgery.    Chronic pain syndrome (present on admission)  Assessment & Plan  Nonspecific, along with DJD she has a diagnosis of \"fibro-myalgia\". On pain medicines currently.    Sepsis (CMS-HCC)  Assessment & Plan  This is sepsis (without associated acute organ dysfunction).   Resolved, finding of bacteremia today from cultures obtained on admission.  Discussed with patient and infectious disease doctor Bryson will see the patient today in consultation.  Continue Rocephin    Labs reviewed, Medications reviewed and Radiology images reviewed  Bates catheter: No Bates      DVT Prophylaxis: Enoxaparin (Lovenox)                  "

## 2017-08-23 NOTE — PROGRESS NOTES
Discussed with patient and daughter regarding the event that occurred. Patient wanted a pie and staff had told her they did not have pie. Patient wanted to go downstairs to the cafeteria and miscommunication occurred. Per patient and daughter cafe staff was rude and did not give an explanation. Patient stated she was upset and wanted to leave ama. Was able to deescalate the situation. Patient decided to stay.

## 2017-08-23 NOTE — ASSESSMENT & PLAN NOTE
Discharge history of being on Dilantin but has been taking it for some time. Complaining of clench teeth resolved with ativan. EEG done and is normal.

## 2017-08-23 NOTE — PROGRESS NOTES
"Pt c/o \"clenched teeth\" and tongue listing to one side. States previous seizure activity has begun with these types of symptoms. Dr. Vazquez notified. New orders received--see MAR. Seizure precautions in place.  "

## 2017-08-24 ENCOUNTER — PATIENT OUTREACH (OUTPATIENT)
Dept: HEALTH INFORMATION MANAGEMENT | Facility: OTHER | Age: 42
End: 2017-08-24

## 2017-08-24 VITALS
DIASTOLIC BLOOD PRESSURE: 68 MMHG | HEIGHT: 62 IN | OXYGEN SATURATION: 99 % | RESPIRATION RATE: 18 BRPM | WEIGHT: 187.39 LBS | HEART RATE: 82 BPM | BODY MASS INDEX: 34.48 KG/M2 | TEMPERATURE: 98.2 F | SYSTOLIC BLOOD PRESSURE: 109 MMHG

## 2017-08-24 PROCEDURE — A9270 NON-COVERED ITEM OR SERVICE: HCPCS | Performed by: HOSPITALIST

## 2017-08-24 PROCEDURE — 700102 HCHG RX REV CODE 250 W/ 637 OVERRIDE(OP): Performed by: INTERNAL MEDICINE

## 2017-08-24 PROCEDURE — 700102 HCHG RX REV CODE 250 W/ 637 OVERRIDE(OP): Performed by: HOSPITALIST

## 2017-08-24 PROCEDURE — A9270 NON-COVERED ITEM OR SERVICE: HCPCS | Performed by: INTERNAL MEDICINE

## 2017-08-24 PROCEDURE — 700111 HCHG RX REV CODE 636 W/ 250 OVERRIDE (IP): Performed by: INTERNAL MEDICINE

## 2017-08-24 PROCEDURE — 99239 HOSP IP/OBS DSCHRG MGMT >30: CPT | Performed by: HOSPITALIST

## 2017-08-24 RX ORDER — SULFAMETHOXAZOLE AND TRIMETHOPRIM 800; 160 MG/1; MG/1
1 TABLET ORAL 2 TIMES DAILY
Qty: 28 TAB | Refills: 0 | Status: SHIPPED | OUTPATIENT
Start: 2017-08-24 | End: 2017-09-07

## 2017-08-24 RX ORDER — ONDANSETRON 4 MG/1
4 TABLET, ORALLY DISINTEGRATING ORAL EVERY 8 HOURS PRN
Qty: 10 TAB | Refills: 0 | Status: SHIPPED | OUTPATIENT
Start: 2017-08-24 | End: 2018-06-01

## 2017-08-24 RX ORDER — CIPROFLOXACIN 500 MG/1
500 TABLET, FILM COATED ORAL EVERY 12 HOURS
Status: DISCONTINUED | OUTPATIENT
Start: 2017-08-24 | End: 2017-08-24 | Stop reason: HOSPADM

## 2017-08-24 RX ORDER — CIPROFLOXACIN 500 MG/1
500 TABLET, FILM COATED ORAL EVERY 12 HOURS
Qty: 28 TAB | Refills: 0 | Status: SHIPPED | OUTPATIENT
Start: 2017-08-24 | End: 2017-08-24

## 2017-08-24 RX ADMIN — OXYCODONE HYDROCHLORIDE 5 MG: 5 TABLET ORAL at 09:11

## 2017-08-24 RX ADMIN — HYDROMORPHONE HYDROCHLORIDE 0.25 MG: 1 INJECTION, SOLUTION INTRAMUSCULAR; INTRAVENOUS; SUBCUTANEOUS at 09:11

## 2017-08-24 RX ADMIN — MORPHINE SULFATE 30 MG: 30 TABLET, EXTENDED RELEASE ORAL at 06:39

## 2017-08-24 RX ADMIN — BUTALBITAL, ACETAMINOPHEN, AND CAFFEINE 1 TABLET: 50; 325; 40 TABLET ORAL at 03:22

## 2017-08-24 RX ADMIN — HYDROMORPHONE HYDROCHLORIDE 0.25 MG: 1 INJECTION, SOLUTION INTRAMUSCULAR; INTRAVENOUS; SUBCUTANEOUS at 03:09

## 2017-08-24 RX ADMIN — CIPROFLOXACIN HYDROCHLORIDE 500 MG: 500 TABLET, FILM COATED ORAL at 09:10

## 2017-08-24 RX ADMIN — OXYCODONE HYDROCHLORIDE 10 MG: 10 TABLET ORAL at 00:21

## 2017-08-24 ASSESSMENT — ENCOUNTER SYMPTOMS
DIARRHEA: 0
HEADACHES: 0
CHILLS: 0
FLANK PAIN: 1
ABDOMINAL PAIN: 0
SHORTNESS OF BREATH: 0
FEVER: 0

## 2017-08-24 ASSESSMENT — PAIN SCALES - GENERAL
PAINLEVEL_OUTOF10: 7
PAINLEVEL_OUTOF10: 8
PAINLEVEL_OUTOF10: 8
PAINLEVEL_OUTOF10: 10

## 2017-08-24 NOTE — ASSESSMENT & PLAN NOTE
Chronic, patient is aware of it and has not been taking iron supplements because they cause loose stools. Patient encouraged to take infant vitamins to get some supplementation  She has had workup in the past

## 2017-08-24 NOTE — DISCHARGE INSTRUCTIONS
Discharge Instructions    Discharged to home by car with relative. Discharged via wheelchair, hospital escort: Yes.  Special equipment needed: Not Applicable    Be sure to schedule a follow-up appointment with your primary care doctor or any specialists as instructed.     Discharge Plan:   Influenza Vaccine Indication: Patient Refuses    I understand that a diet low in cholesterol, fat, and sodium is recommended for good health. Unless I have been given specific instructions below for another diet, I accept this instruction as my diet prescription.   Other diet: as tolerated    Special Instructions: None    · Is patient discharged on Warfarin / Coumadin?   No     · Is patient Post Blood Transfusion?  No  Pyelonephritis, Adult  Pyelonephritis is a kidney infection. In general, there are 2 main types of pyelonephritis:  · Infections that come on quickly without any warning (acute pyelonephritis).  · Infections that persist for a long period of time (chronic pyelonephritis).  CAUSES   Two main causes of pyelonephritis are:  · Bacteria traveling from the bladder to the kidney. This is a problem especially in pregnant women. The urine in the bladder can become filled with bacteria from multiple causes, including:  ¨ Inflammation of the prostate gland (prostatitis).  ¨ Sexual intercourse in females.  ¨ Bladder infection (cystitis).  · Bacteria traveling from the bloodstream to the tissue part of the kidney.  Problems that may increase your risk of getting a kidney infection include:  · Diabetes.  · Kidney stones or bladder stones.  · Cancer.  · Catheters placed in the bladder.  · Other abnormalities of the kidney or ureter.  SYMPTOMS   · Abdominal pain.  · Pain in the side or flank area.  · Fever.  · Chills.  · Upset stomach.  · Blood in the urine (dark urine).  · Frequent urination.  · Strong or persistent urge to urinate.  · Burning or stinging when urinating.  DIAGNOSIS   Your caregiver may diagnose your kidney  infection based on your symptoms. A urine sample may also be taken.  TREATMENT   In general, treatment depends on how severe the infection is.   · If the infection is mild and caught early, your caregiver may treat you with oral antibiotics and send you home.  · If the infection is more severe, the bacteria may have gotten into the bloodstream. This will require intravenous (IV) antibiotics and a hospital stay. Symptoms may include:  ¨ High fever.  ¨ Severe flank pain.  ¨ Shaking chills.  · Even after a hospital stay, your caregiver may require you to be on oral antibiotics for a period of time.  · Other treatments may be required depending upon the cause of the infection.  HOME CARE INSTRUCTIONS   · Take your antibiotics as directed. Finish them even if you start to feel better.  · Make an appointment to have your urine checked to make sure the infection is gone.  · Drink enough fluids to keep your urine clear or pale yellow.  · Take medicines for the bladder if you have urgency and frequency of urination as directed by your caregiver.  SEEK IMMEDIATE MEDICAL CARE IF:   · You have a fever or persistent symptoms for more than 2-3 days.  · You have a fever and your symptoms suddenly get worse.  · You are unable to take your antibiotics or fluids.  · You develop shaking chills.  · You experience extreme weakness or fainting.  · There is no improvement after 2 days of treatment.  MAKE SURE YOU:  · Understand these instructions.  · Will watch your condition.  · Will get help right away if you are not doing well or get worse.     This information is not intended to replace advice given to you by your health care provider. Make sure you discuss any questions you have with your health care provider.     Document Released: 12/18/2006 Document Revised: 06/18/2013 Document Reviewed: 05/23/2012  [employer] Patient Education ©2016 [employer]    Sepsis, Adult  Sepsis is a serious infection of your blood or tissues  that affects your whole body. The infection that causes sepsis may be bacterial, viral, fungal, or parasitic. Sepsis may be life threatening. Sepsis can cause your blood pressure to drop. This may result in shock. Shock causes your central nervous system and your organs to stop working correctly.   RISK FACTORS  Sepsis can happen in anyone, but it is more likely to happen in people who have weakened immune systems.  SIGNS AND SYMPTOMS   Symptoms of sepsis can include:  · Fever or low body temperature (hypothermia).  · Rapid breathing (hyperventilation).  · Chills.  · Rapid heartbeat (tachycardia).  · Confusion or light-headedness.  · Trouble breathing.  · Urinating much less than usual.  · Cool, clammy skin or red, flushed skin.  · Other problems with the heart, kidneys, or brain.  DIAGNOSIS   Your health care provider will likely do tests to look for an infection, to see if the infection has spread to your blood, and to see how serious your condition is. Tests can include:  · Blood tests, including cultures of your blood.  · Cultures of other fluids from your body, such as:  ¨ Urine.  ¨ Pus from wounds.  ¨ Mucus coughed up from your lungs.  · Urine tests other than cultures.  · X-ray exams or other imaging tests.  TREATMENT   Treatment will begin with elimination of the source of infection. If your sepsis is likely caused by a bacterial or fungal infection, you will be given antibiotic or antifungal medicines.  You may also receive:  · Oxygen.  · Fluids through an IV tube.  · Medicines to increase your blood pressure.  · A machine to clean your blood (dialysis) if your kidneys fail.  · A machine to help you breathe if your lungs fail.  SEEK IMMEDIATE MEDICAL CARE IF:  You get an infection or develop any of the signs and symptoms of sepsis after surgery or a hospitalization.     This information is not intended to replace advice given to you by your health care provider. Make sure you discuss any questions you have  with your health care provider.     Document Released: 09/15/2004 Document Revised: 05/03/2016 Document Reviewed: 08/25/2014  [employer] Interactive Patient Education ©2016 [employer]    Depression / Suicide Risk    As you are discharged from this [facility] facility, it is important to learn how to keep safe from harming yourself.    Recognize the warning signs:  · Abrupt changes in personality, positive or negative- including increase in energy   · Giving away possessions  · Change in eating patterns- significant weight changes-  positive or negative  · Change in sleeping patterns- unable to sleep or sleeping all the time   · Unwillingness or inability to communicate  · Depression  · Unusual sadness, discouragement and loneliness  · Talk of wanting to die  · Neglect of personal appearance   · Rebelliousness- reckless behavior  · Withdrawal from people/activities they love  · Confusion- inability to concentrate     If you or a loved one observes any of these behaviors or has concerns about self-harm, here's what you can do:  · Talk about it- your feelings and reasons for harming yourself  · Remove any means that you might use to hurt yourself (examples: pills, rope, extension cords, firearm)  · Get professional help from the community (Mental Health, Substance Abuse, psychological counseling)  · Do not be alone:Call your Safe Contact- someone whom you trust who will be there for you.  · Call your local CRISIS HOTLINE 793-3678 or 712-294-5967  · Call your local Children's Mobile Crisis Response Team [place] (173) 995-7776 or www.Wordlock  · Call the toll free National Suicide Prevention Hotlines   · National Suicide Prevention Lifeline 553-210-QKVR (2948)  · National Hope Line Network 800-SUICIDE (161-9152)

## 2017-08-24 NOTE — PROGRESS NOTES
Discharge instructions and prescriptions explained & provided to patient. Verbalized understanding. IV removed, tip intact. Pt discharged to home with all personal belongings.

## 2017-08-24 NOTE — PROGRESS NOTES
Received bedside report from ZOHREH Danielle. Plan of care discussed. Safety and siezure precautions in place. Call light and personal belongings within reach. Patient has no needs at this time.

## 2017-08-24 NOTE — DISCHARGE SUMMARY
"CHIEF COMPLAINT ON ADMISSION  Chief Complaint   Patient presents with   • Flank Pain     Pt c/o bilateral flank pain. Pt has history of kidney stones and states this \"attack\" has been ongoing for over one week.    • Blood in Sputum     Pt states she had \"half of her right lung removed\" in November 2016. Pt states she began coughing up blood yesterday       CODE STATUS  Full Code    HPI & HOSPITAL COURSE  This is a 41 y.o. female here withAcute pyelonephritis. She did grow E. coli in her bloodstream and in the urine. This is a pansensitive E. coli. Due to the fact that the patient is on tizanidine at home she will be continued on trimethoprim sulfamethoxazole double strength twice daily the next 14 days. The tizanidine is not compatible with Cipro. The E. coli is quite sensitive to antibiotics. Ultrasound showed mild right hydronephrosis but no kidney stone consistent with a recently passed stone.    Therefore, she is discharged in good and stable condition with close outpatient follow-up.    SPECIFIC OUTPATIENT FOLLOW-UP  Primary care, clinic information given for patient.    DISCHARGE PROBLEM LIST  Principal Problem:    Pyelonephritis POA: Yes  Active Problems:    Seizure disorder (CMS-[facility]) POA: Yes    Opioid dependence (CMS-[facility]) POA: Yes    Degenerative joint disease POA: Yes    Chronic pain syndrome POA: Yes    Sepsis (CMS-[facility]) POA: Yes    Iron deficiency anemia POA: Yes  Resolved Problems:    * No resolved hospital problems. *      FOLLOW UP  No future appointments.  Primary Care ([facility])    Schedule an appointment as soon as possible for a visit in 1 week  Hospital follow up      MEDICATIONS ON DISCHARGE   Tiffany Gottlieb Too   Home Medication Instructions TOR:[id number]    Printed on:08/24/17 1014   Medication Information                      ciprofloxacin (CIPRO) 500 MG Tab  Take 1 Tab by mouth every 12 hours for 14 days.             morphine ER (MS CONTIN) 30 MG Tab CR tablet  Take 30 mg by mouth every 8 " hours as needed.             oxycodone immediate release (ROXICODONE) 10 MG immediate release tablet  Take 10 mg by mouth every four hours as needed for Moderate Pain.             tizanidine (ZANAFLEX) 4 MG Tab  Take 8 mg by mouth 3 times a day as needed (muscle spasms).                 DIET  Orders Placed This Encounter   Procedures   • Diet Order     Standing Status: Standing      Number of Occurrences: 1      Standing Expiration Date:      Order Specific Question:  Diet:     Answer:  Regular [1]       ACTIVITY  As tolerated.  Weight bearing as tolerated      CONSULTATIONS  Infectious disease, Dr. Massey    PROCEDURES  None.    LABORATORY  Lab Results   Component Value Date/Time    SODIUM 138 08/22/2017 05:28 AM    POTASSIUM 4.1 08/22/2017 05:28 AM    CHLORIDE 112 08/22/2017 05:28 AM    CO2 23 08/22/2017 05:28 AM    GLUCOSE 134* 08/22/2017 05:28 AM    BUN <5* 08/22/2017 05:28 AM    CREATININE 0.70 08/22/2017 05:28 AM        Lab Results   Component Value Date/Time    WBC 6.4 08/22/2017 05:28 AM    HEMOGLOBIN 9.5* 08/22/2017 05:28 AM    HEMATOCRIT 31.6* 08/22/2017 05:28 AM    PLATELET COUNT 165 08/22/2017 05:28 AM        Total time of the discharge process exceeds 36 minutes

## 2017-08-24 NOTE — PROGRESS NOTES
Gave bedside report to ZOHREH Ferreira. Plan of care discussed. Safety and seizure precautions in place. Call light and personal belongings within reach. Patient sleeping at this time.

## 2017-08-24 NOTE — PROGRESS NOTES
Infectious Disease Progress Note    Author: Toyin Massey M.D. Date & Time of service: 2017  9:12 AM    Chief Complaint:  Dysuria and right flank pain    FU gram negative sepsis    Interval History:   AF, no CBC, continues to have dysuria and R flank pain, no diarrhea, tolerating abx without issues   AF, no CBC, dysuria and flank pain better today, anxious to go home, Cx +E coli  Labs Reviewed, Medications Reviewed, Radiology Reviewed and Wound Reviewed.    Review of Systems:  Review of Systems   Constitutional: Negative for fever and chills.   Respiratory: Negative for shortness of breath.    Gastrointestinal: Negative for abdominal pain and diarrhea.   Genitourinary: Positive for dysuria and flank pain. Negative for hematuria.        Improving   Neurological: Negative for headaches.       Hemodynamics:  Temp (24hrs), Av.8 °C (98.3 °F), Min:36.7 °C (98 °F), Max:36.9 °C (98.5 °F)  Temperature: 36.7 °C (98 °F)  Pulse  Av.5  Min: 41  Max: 129   Blood Pressure: 100/57 mmHg       Physical Exam:  Physical Exam   Constitutional: She is oriented to person, place, and time. She appears well-developed.   HENT:   Head: Normocephalic and atraumatic.   Eyes: EOM are normal. Pupils are equal, round, and reactive to light.   Neck: Neck supple.   Cardiovascular: Normal rate, regular rhythm and normal heart sounds.    Pulmonary/Chest: Effort normal and breath sounds normal.   Abdominal: Soft. There is tenderness.   R CVA tenderness - improving    Mid line abdominal surgical scar well healed   Musculoskeletal: Normal range of motion. She exhibits no edema.   Neurological: She is alert and oriented to person, place, and time.   Skin: No rash noted.       Meds:    Current facility-administered medications:   •  ciprofloxacin  •  morphine ER  •  oxycodone immediate release  •  tizanidine  •  lorazepam  •  senna-docusate **AND** polyethylene glycol/lytes **AND** magnesium hydroxide **AND** bisacodyl  •   acetaminophen  •  Notify provider if pain remains uncontrolled **AND** Use the numeric rating scale (NRS-11) on regular floors and Critical-Care Pain Observation Tool (CPOT) on ICUs/Trauma to assess pain **AND** Pulse Ox (Oximetry) **AND** Pharmacy Consult Request **AND** If patient difficult to arouse and/or has respiratory depression, stop any opiates that are currently infusing and call a Rapid Response. **AND** oxycodone immediate-release **AND** oxycodone immediate-release **AND** HYDROmorphone  •  NS  •  prochlorperazine  •  phenazopyridine  •  acetaminophen/caffeine/butalbital 325-40-50 mg    Labs:  Recent Labs      08/22/17   0528   WBC  6.4   RBC  4.30   HEMOGLOBIN  9.5*   HEMATOCRIT  31.6*   MCV  73.5*   MCH  22.1*   RDW  47.6   PLATELETCT  165   MPV  10.4     Recent Labs      08/22/17   0528   SODIUM  138   POTASSIUM  4.1   CHLORIDE  112   CO2  23   GLUCOSE  134*   BUN  <5*     Recent Labs      08/22/17   0528   CREATININE  0.70       Imaging:  Ct-renal Colic Evaluation(a/p W/o)    8/21/2017 8/21/2017 8:03 AM HISTORY/REASON FOR EXAM:  Lower abdominal pain and history of calculi TECHNIQUE/EXAM DESCRIPTION AND NUMBER OF VIEWS: CT scan renal/colic without contrast. 5 mm helical images of the abdomen and pelvis were obtained from the diaphragmatic domes through the pubic symphysis. Low dose optimization technique was utilized for this CT exam including automated exposure control and adjustment of the mA and/or kV according to patient size. Comparison: 12/08/2016, 7/31/2016 FINDINGS: Abdomen: There is mild prominence of right renal collecting system and proximal right ureter. No ureteral calcifications are appreciated. Minimal linear atelectasis is noted in the left lung base. No large masses are seen within the upper abdomen. Postoperative changes are noted in the stomach. Surgical clips are noted in the right upper quadrant. Pelvis: No urinary tract calcifications or evidence of obstruction are identified  in the pelvis. There is no evidence of inflammatory change seen in the region of the bowel. Multiple bilateral pelvic calcifications are identified which appear vascular and unchanged compared to prior CT. Left adnexal cyst measuring 3.5 cm is again noted and unchanged.     8/21/2017  1.  THERE IS MINIMAL PROMINENCE OF RIGHT RENAL COLLECTING SYSTEM AND RIGHT PROXIMAL URETER. HOWEVER, NO OBSTRUCTING CALCULUS IS CURRENTLY IDENTIFIED. RECENTLY PASSED CALCULUS IS A POSSIBILITY. 2.  MULTIPLE PELVIC CALCIFICATIONS ARE IDENTIFIED WHICH ARE UNCHANGED AND LIKELY VASCULAR. 3.  3.5 CM LEFT ADNEXAL CYST IS AGAIN IDENTIFIED. 4.  POSTOPERATIVE CHANGES AGAIN NOTED IN THE UPPER ABDOMEN.     Dx-chest-portable (1 View)    8/20/2017 8/20/2017 10:10 PM HISTORY/REASON FOR EXAM: Shortness of breath TECHNIQUE/EXAM DESCRIPTION AND NUMBER OF VIEWS: Single portable view of the chest. COMPARISON: 12/12/2016 FINDINGS: There is again seen linear atelectasis versus scarring within the right middle lung. There are surgical clips within the right lung. There is no pleural effusion. The heart is normal in size.     8/20/2017  1.  Again seen scarring versus atelectasis within the right midlung. 2.  Otherwise no evidence of acute cardiopulmonary process.    Us-renal    8/21/2017 8/21/2017 6:08 AM HISTORY/REASON FOR EXAM:  Bilateral flank pain. TECHNIQUE/EXAM DESCRIPTION: Renal ultrasound. COMPARISON:  None FINDINGS: The right kidney measures 11.29 cm. The left kidney measures 10.93 cm. There is mild right hydronephrosis. There are no abnormal calcifications. The bladder demonstrates no focal wall abnormality. Bilateral ureteral jets are seen.     8/21/2017  1.  Mild right hydronephrosis. 2.  No evidence of solid renal mass.      Micro:  Results     Procedure Component Value Units Date/Time    URINE CULTURE(NEW) [[id number]]  (Abnormal)  (Susceptibility) Collected:  08/20/17 1486    Order Status:  Completed Specimen Information:  Urine Updated:   08/23/17 0946     Significant Indicator POS (POS)      Source UR      Site --      Urine Culture -- (A)      Urine Culture -- (A)      Result:        Escherichia coli  >100,000 cfu/mL      Narrative:      Indication for culture:->Emergency Room Patient    Culture & Susceptibility     ESCHERICHIA COLI     Antibiotic Sensitivity Microscan Unit Status    Ampicillin Sensitive <=8 mcg/mL Final    Cefepime Sensitive <=8 mcg/mL Final    Cefotaxime Sensitive <=2 mcg/mL Final    Cefotetan Sensitive <=16 mcg/mL Final    Ceftazidime Sensitive <=1 mcg/mL Final    Ceftriaxone Sensitive <=8 mcg/mL Final    Cefuroxime Sensitive 8 mcg/mL Final    Cephalothin Sensitive <=8 mcg/mL Final    Ciprofloxacin Sensitive <=1 mcg/mL Final    Gentamicin Sensitive <=4 mcg/mL Final    Levofloxacin Sensitive <=2 mcg/mL Final    Nitrofurantoin Sensitive <=32 mcg/mL Final    Pip/Tazobactam Sensitive <=16 mcg/mL Final    Piperacillin Sensitive <=16 mcg/mL Final    Tigecycline Sensitive <=2 mcg/mL Final    Tobramycin Sensitive <=4 mcg/mL Final    Trimeth/Sulfa Sensitive <=2/38 mcg/mL Final                       BLOOD CULTURE [[id number]]  (Abnormal)  (Susceptibility) Collected:  08/20/17 2202    Order Status:  Completed Specimen Information:  Blood from Peripheral Updated:  08/23/17 0857     Significant Indicator POS (POS)      Source BLD      Site PERIPHERAL      Blood Culture -- (A)      Result:        Blood culture testing and Gram stain, if indicated, are  performed at [facility], [address] [place] [address].  Positive blood cultures are  sent to [facility], [address] [place] [address], for organism identification and  susceptibility testing.  Growth detected by Bactec instrument.       Blood Culture Escherichia coli (A)     Narrative:      CALL  [facility]  MED tel. [phone],  CALLED  MED tel. [phone] 08/21/2017, 12:36, RB PERF. RESULTS CALLED  TO:[phone] rn  Per Hospital Policy: Only change  "Specimen Src: to \"Line\" if  specified by physician order.    Culture & Susceptibility     ESCHERICHIA COLI     Antibiotic Sensitivity Microscan Unit Status    Ampicillin Sensitive <=8 mcg/mL Final    Cefepime Sensitive <=8 mcg/mL Final    Cefotaxime Sensitive <=2 mcg/mL Final    Cefotetan Sensitive <=16 mcg/mL Final    Ceftazidime Sensitive <=1 mcg/mL Final    Ceftriaxone Sensitive <=8 mcg/mL Final    Cefuroxime Sensitive <=4 mcg/mL Final    Ciprofloxacin Sensitive <=1 mcg/mL Final    Ertapenem Sensitive <=1 mcg/mL Final    Gentamicin Sensitive <=4 mcg/mL Final    Pip/Tazobactam Sensitive <=16 mcg/mL Final    Tigecycline Sensitive <=2 mcg/mL Final    Tobramycin Sensitive <=4 mcg/mL Final    Trimeth/Sulfa Sensitive <=2/38 mcg/mL Final                       BLOOD CULTURE [[id number]] Collected:  08/22/17 1310    Order Status:  Completed Specimen Information:  Blood from Peripheral Updated:  08/23/17 0836     Significant Indicator NEG      Source BLD      Site PERIPHERAL      Blood Culture --      Result:        No Growth    Note: Blood cultures are incubated for 5 days and  are monitored continuously.Positive blood cultures  are called to the RN and reported as soon as  they are identified.      Narrative:      Per Hospital Policy: Only change Specimen Src: to \"Line\" if  specified by physician order.    BLOOD CULTURE [[id number]] Collected:  08/22/17 1304    Order Status:  Completed Specimen Information:  Blood from Peripheral Updated:  08/23/17 0836     Significant Indicator NEG      Source BLD      Site PERIPHERAL      Blood Culture --      Result:        No Growth    Note: Blood cultures are incubated for 5 days and  are monitored continuously.Positive blood cultures  are called to the RN and reported as soon as  they are identified.      Narrative:      Per Hospital Policy: Only change Specimen Src: to \"Line\" if  specified by physician order.    BLOOD CULTURE [[id number]] Collected:  08/20/17 2153    Order Status:  " "Completed Specimen Information:  Blood from Peripheral Updated:  08/21/17 0737     Significant Indicator NEG      Source BLD      Site PERIPHERAL      Blood Culture --      Result:        No Growth    Note: Blood cultures are incubated for 5 days and  are monitored continuously.Positive blood cultures  are called to the RN and reported as soon as  they are identified.  Blood culture testing and Gram stain, if indicated, are  performed at [facility], [address] [place] [address].  Positive blood cultures are  sent to [facility], [address] [place] [address], for organism identification and  susceptibility testing.      Narrative:      Per Hospital Policy: Only change Specimen Src: to \"Line\" if  specified by physician order.    Urinalysis [[id number]] Collected:  08/21/17 0000    Order Status:  Canceled Specimen Information:  Urine     Narrative:      ORDER WAS CANCELLED 08/21/2017 01:18, Duplicate . 08/21/2017  01:18.  If not done within the last 24 hours    URINALYSIS [[id number]]  (Abnormal) Collected:  08/20/17 2215    Order Status:  Completed Specimen Information:  Urine Updated:  08/20/17 2313     Color Straw      Comment: Corrected result; previously reported as Yellow on 08/20/17 at 22:56        Character Hazy (A)      Specific Gravity 1.010      Ph 5.5      Glucose Negative mg/dL      Ketones Negative mg/dL      Protein 30 (A) mg/dL      Bilirubin Negative      Nitrite Positive (A)      Leukocyte Esterase Moderate (A)      Occult Blood Large (A)      Micro Urine Req Microscopic     Narrative:      Indication for culture:->Emergency Room Patient          Assessment:  Active Hospital Problems    Diagnosis   • *Pyelonephritis [N12]   • Sepsis (CMS-HCC) [A41.9]   • Chronic pain syndrome [G89.4]   • Degenerative joint disease [M19.90]   • Opioid dependence (CMS-HCC) [F11.20]   • Seizure disorder (CMS-HCC) [G40.909]       Plan:  Gram negative " sepsis  2/2 urinary source  Fevers resolved  No leukocytosis  Bcx - E coli  Ucx - E coli  DC ceftriaxone  Transition to PO cipro to complete 2 weeks from 8/22.  Stop date 9/5/17  Repeat Bcx 8/22 - NGTD    Right pyelonephritis  Improving  Abx as per above    H/o nephrolithiasis  Possibly passed renal stone on renal CT    FU ID clinic as needed    OK to DC pt today from ID standpoint    Discussed with internal medicine/Dr. Vazquez. ID signing off. Please reconsult if needed.

## 2017-08-24 NOTE — PROGRESS NOTES
"Assessment completed--see doc flowsheet. A&Ox4; anxious. Meds provided. Medicated for \"11/10\" right flank pain as well as hip/back discomfort--see MAR. POC discussed, verbalized understanding. Call light and personal possessions within reach, bed in low position, encouraged to call for assistance.  "

## 2017-08-24 NOTE — CARE PLAN
Problem: Pain Management  Goal: Pain level will decrease to patient’s comfort goal  Outcome: PROGRESSING AS EXPECTED  Discouraged use of IV medications in preparation of discharge home but pt continues to rate high levels of pain and requesting IV pain medications. Pt medicated with alternating oxycodone, fioricet, zanaflex, and dilaudid per MAR for control of flank pain/headache. Encouraged use of heat packs and other non-pharmacological interventions but pt refuses.     Problem: Urinary Elimination:  Goal: Ability to reestablish a normal urinary elimination pattern will improve  Outcome: PROGRESSING AS EXPECTED  Pt continuing to report some pain with urination and frequency but improving. Voiding adequately.

## 2017-08-24 NOTE — PROGRESS NOTES
[facility] Hospitalist Progress Note    Date of Service: 2017    Chief Complaint  41 y.o. female admitted 2017 with acute pyelonephritis    Interval Problem Update  Doing better pain is improved, anxious to go home.  Daughter at bedside.  No teeth clenching or seizure activity.    Consultants/Specialty  Infectious disease    Disposition  Home when medically cleared        Review of Systems   Constitutional: Positive for malaise/fatigue. Negative for fever, chills and weight loss.   Respiratory: Negative.  Negative for cough and shortness of breath.    Cardiovascular: Negative.  Negative for chest pain and leg swelling.   Gastrointestinal: Negative.  Negative for nausea, vomiting and abdominal pain.   Genitourinary: Negative.  Negative for dysuria and flank pain.   Musculoskeletal: Positive for back pain, joint pain and neck pain. Negative for myalgias.   Neurological: Negative for dizziness, seizures, loss of consciousness and weakness.   Endo/Heme/Allergies: Negative.  Does not bruise/bleed easily.   Psychiatric/Behavioral: Negative.  Negative for depression. The patient is not nervous/anxious.    All other systems reviewed and are negative.     Physical Exam  Laboratory/Imaging   Hemodynamics  Temp (24hrs), Av °C (98.6 °F), Min:36.9 °C (98.4 °F), Max:37.1 °C (98.7 °F)   Temperature: 37 °C (98.6 °F)  Pulse  Av.3  Min: 41  Max: 129    Blood Pressure: 115/59 mmHg      Respiratory      Respiration: 18, Pulse Oximetry: 95 %, O2 Daily Delivery Respiratory : Room Air with O2 Available        RUL Breath Sounds: Clear, RML Breath Sounds: Clear, KEDAR Breath Sounds: Clear, LLL Breath Sounds: Clear    Fluids    Intake/Output Summary (Last 24 hours) at 17 9029  Last data filed at 17 1800   Gross per 24 hour   Intake   1250 ml   Output    300 ml   Net    950 ml       Nutrition  Orders Placed This Encounter   Procedures   • Diet Order     Standing Status: Standing      Number of Occurrences: 1       Standing Expiration Date:      Order Specific Question:  Diet:     Answer:  Regular [1]     Physical Exam   Constitutional: She appears well-developed and well-nourished. No distress.   HENT:   Nose: Nose normal.   Mouth/Throat: Oropharynx is clear and moist. No oropharyngeal exudate.   Eyes: Conjunctivae are normal. Right eye exhibits no discharge. Left eye exhibits no discharge. No scleral icterus.   Neck: No JVD present. No tracheal deviation present.   Cardiovascular: Normal rate, regular rhythm and normal heart sounds.    Pulmonary/Chest: Effort normal and breath sounds normal. No stridor. No respiratory distress. She has no wheezes. She has no rales. She exhibits no tenderness.   Abdominal: Soft. Bowel sounds are normal. She exhibits no distension. There is no tenderness.   Musculoskeletal: She exhibits no edema or tenderness.   Neurological: She is alert. No cranial nerve deficit. She exhibits normal muscle tone.   Skin: Skin is warm and dry. She is not diaphoretic. No pallor.   Psychiatric: She has a normal mood and affect. Her behavior is normal.   Nursing note and vitals reviewed.      Recent Labs      08/20/17 2153 08/22/17   0528   WBC  7.8  6.4   RBC  4.57  4.30   HEMOGLOBIN  10.4*  9.5*   HEMATOCRIT  32.9*  31.6*   MCV  72.0*  73.5*   MCH  22.8*  22.1*   MCHC  31.6*  30.1*   RDW  43.8  47.6   PLATELETCT  216  165   MPV  10.3  10.4     Recent Labs      08/20/17   2153  08/22/17   0528   SODIUM  133*  138   POTASSIUM  3.7  4.1   CHLORIDE  104  112   CO2  21  23   GLUCOSE  105*  134*   BUN  11  <5*   CREATININE  0.80  0.70   CALCIUM  8.3*  8.0*     Recent Labs      08/20/17   2227   APTT  27.7   INR  1.11                  Assessment/Plan     * Pyelonephritis (present on admission)  Assessment & Plan  With positive blood cultures growing E coli, pan senstitive  Anticipate that ID will let her discharge home tomorrow on oral antibiotics  Renal ultrasound shows some mild right hydronephrosis consistent  with a recently passed stone  Pain control    Seizure disorder (CMS-HCC) (present on admission)  Assessment & Plan  Discharge history of being on Dilantin but has been taking it for some time. Complaining of clench teeth resolved with ativan. EEG done and is normal.    Opioid dependence (CMS-HCC) (present on admission)  Assessment & Plan  On narcotics for chronic pain syndrome and degenerative joint disease. No change in regimen needed today  On pain control anyway.    Degenerative joint disease (present on admission)  Assessment & Plan  By history, on chronic narcotic pain management awaiting hip surgery.    Chronic pain syndrome (present on admission)  Assessment & Plan  Nonspecific, along with DJD and fibromyalgia    Sepsis (CMS-HCC) (present on admission)  Assessment & Plan  This is sepsis (without associated acute organ dysfunction).   Resolved, finding of bacteremia today from cultures obtained on admission.  ID following  Continue Rocephin    Iron deficiency anemia (present on admission)  Assessment & Plan  Chronic, patient is aware of it and has not been taking iron supplements because they cause loose stools. Patient encouraged to take infant vitamins to get some supplementation  She has had workup in the past      Labs reviewed, Medications reviewed and Radiology images reviewed  Bates catheter: No Bates      DVT Prophylaxis: Enoxaparin (Lovenox)

## 2017-08-24 NOTE — CARE PLAN
Problem: Pain Management  Goal: Pain level will decrease to patient’s comfort goal  Outcome: PROGRESSING AS EXPECTED  Pt encouraged to verbalize experiencing pain. 0-10 pain scale explained, verbalized understanding. Educated re: non-pharm methods of pain relief/control. Administer pain meds as ordered.    Problem: Psychosocial Needs:  Goal: Level of anxiety will decrease  Outcome: PROGRESSING AS EXPECTED  One-on-one discussion. Identify triggers for anxiety. Encourage pt to verbalize feelings of anxiety. Educated and encouraged re: non-pharm methods of anxiety reduction. Administer medications prn as ordered.

## 2017-08-24 NOTE — PROGRESS NOTES
[facility]side [facility] nurseHanna RN. Assumed care. Pt resting in bed. Safety precautions in place.

## 2017-08-25 ENCOUNTER — PATIENT OUTREACH (OUTPATIENT)
Dept: HEALTH INFORMATION MANAGEMENT | Facility: OTHER | Age: 42
End: 2017-08-25

## 2017-08-25 LAB
BACTERIA BLD CULT: NORMAL
SIGNIFICANT IND 70042: NORMAL
SITE SITE: NORMAL
SOURCE SOURCE: NORMAL

## 2017-08-25 NOTE — PROGRESS NOTES
08/25/2017 1425 - Discharge Outreach - Patient answered, but busy with crying child. Asked that I call back later.  08/25/2017 8636 - Discharge Outreach - LM

## 2017-08-27 LAB
BACTERIA BLD CULT: NORMAL
BACTERIA BLD CULT: NORMAL
SIGNIFICANT IND 70042: NORMAL
SIGNIFICANT IND 70042: NORMAL
SITE SITE: NORMAL
SITE SITE: NORMAL
SOURCE SOURCE: NORMAL
SOURCE SOURCE: NORMAL

## 2017-09-01 ENCOUNTER — TELEPHONE (OUTPATIENT)
Dept: INFECTIOUS DISEASES | Facility: MEDICAL CENTER | Age: 42
End: 2017-09-01

## 2017-09-11 ENCOUNTER — TELEPHONE (OUTPATIENT)
Dept: INFECTIOUS DISEASES | Facility: MEDICAL CENTER | Age: 42
End: 2017-09-11

## 2017-09-22 ENCOUNTER — TELEPHONE (OUTPATIENT)
Dept: INFECTIOUS DISEASES | Facility: MEDICAL CENTER | Age: 42
End: 2017-09-22

## 2017-09-22 NOTE — TELEPHONE ENCOUNTER
Called and spoke with pt. She has finished the antibiotics. She is feeling fine and does not feel that she needs to follow up at the office. She will call back if she is in need of our services. KEN

## 2018-06-01 ENCOUNTER — HOSPITAL ENCOUNTER (EMERGENCY)
Facility: MEDICAL CENTER | Age: 43
End: 2018-06-01
Attending: EMERGENCY MEDICINE
Payer: COMMERCIAL

## 2018-06-01 VITALS
TEMPERATURE: 98.3 F | HEIGHT: 62 IN | WEIGHT: 160.94 LBS | OXYGEN SATURATION: 98 % | SYSTOLIC BLOOD PRESSURE: 114 MMHG | BODY MASS INDEX: 29.62 KG/M2 | RESPIRATION RATE: 18 BRPM | DIASTOLIC BLOOD PRESSURE: 69 MMHG | HEART RATE: 56 BPM

## 2018-06-01 DIAGNOSIS — K29.50 CHRONIC GASTRITIS WITHOUT BLEEDING, UNSPECIFIED GASTRITIS TYPE: ICD-10-CM

## 2018-06-01 LAB
ALBUMIN SERPL BCP-MCNC: 3.6 G/DL (ref 3.2–4.9)
ALBUMIN/GLOB SERPL: 1.3 G/DL
ALP SERPL-CCNC: 50 U/L (ref 30–99)
ALT SERPL-CCNC: 19 U/L (ref 2–50)
ANION GAP SERPL CALC-SCNC: 7 MMOL/L (ref 0–11.9)
APPEARANCE UR: CLEAR
AST SERPL-CCNC: 23 U/L (ref 12–45)
BASOPHILS # BLD AUTO: 0.4 % (ref 0–1.8)
BASOPHILS # BLD: 0.03 K/UL (ref 0–0.12)
BILIRUB SERPL-MCNC: 0.5 MG/DL (ref 0.1–1.5)
BUN SERPL-MCNC: 12 MG/DL (ref 8–22)
CALCIUM SERPL-MCNC: 8.5 MG/DL (ref 8.4–10.2)
CHLORIDE SERPL-SCNC: 106 MMOL/L (ref 96–112)
CO2 SERPL-SCNC: 25 MMOL/L (ref 20–33)
COLOR UR: YELLOW
CREAT SERPL-MCNC: 0.74 MG/DL (ref 0.5–1.4)
EKG IMPRESSION: NORMAL
EOSINOPHIL # BLD AUTO: 0.15 K/UL (ref 0–0.51)
EOSINOPHIL NFR BLD: 2 % (ref 0–6.9)
ERYTHROCYTE [DISTWIDTH] IN BLOOD BY AUTOMATED COUNT: 43.2 FL (ref 35.9–50)
GLOBULIN SER CALC-MCNC: 2.7 G/DL (ref 1.9–3.5)
GLUCOSE SERPL-MCNC: 62 MG/DL (ref 65–99)
GLUCOSE UR STRIP.AUTO-MCNC: NEGATIVE MG/DL
HCG UR QL: NEGATIVE
HCT VFR BLD AUTO: 36 % (ref 37–47)
HGB BLD-MCNC: 10.9 G/DL (ref 12–16)
IMM GRANULOCYTES # BLD AUTO: 0.01 K/UL (ref 0–0.11)
IMM GRANULOCYTES NFR BLD AUTO: 0.1 % (ref 0–0.9)
KETONES UR STRIP.AUTO-MCNC: ABNORMAL MG/DL
LEUKOCYTE ESTERASE UR QL STRIP.AUTO: NEGATIVE
LIPASE SERPL-CCNC: 19 U/L (ref 7–58)
LYMPHOCYTES # BLD AUTO: 1.73 K/UL (ref 1–4.8)
LYMPHOCYTES NFR BLD: 23.6 % (ref 22–41)
MCH RBC QN AUTO: 22.8 PG (ref 27–33)
MCHC RBC AUTO-ENTMCNC: 30.3 G/DL (ref 33.6–35)
MCV RBC AUTO: 75.3 FL (ref 81.4–97.8)
MONOCYTES # BLD AUTO: 0.49 K/UL (ref 0–0.85)
MONOCYTES NFR BLD AUTO: 6.7 % (ref 0–13.4)
NEUTROPHILS # BLD AUTO: 4.92 K/UL (ref 2–7.15)
NEUTROPHILS NFR BLD: 67.2 % (ref 44–72)
NITRITE UR QL STRIP.AUTO: NEGATIVE
NRBC # BLD AUTO: 0 K/UL
NRBC BLD-RTO: 0 /100 WBC
PH UR STRIP.AUTO: 5 [PH]
PLATELET # BLD AUTO: 305 K/UL (ref 164–446)
PMV BLD AUTO: 9.2 FL (ref 9–12.9)
POTASSIUM SERPL-SCNC: 3.2 MMOL/L (ref 3.6–5.5)
PROT SERPL-MCNC: 6.3 G/DL (ref 6–8.2)
PROT UR QL STRIP: NEGATIVE MG/DL
RBC # BLD AUTO: 4.78 M/UL (ref 4.2–5.4)
RBC UR QL AUTO: ABNORMAL
SODIUM SERPL-SCNC: 138 MMOL/L (ref 135–145)
SP GR UR STRIP.AUTO: 1.01
TROPONIN I SERPL-MCNC: <0.02 NG/ML (ref 0–0.04)
WBC # BLD AUTO: 7.3 K/UL (ref 4.8–10.8)

## 2018-06-01 PROCEDURE — 85025 COMPLETE CBC W/AUTO DIFF WBC: CPT

## 2018-06-01 PROCEDURE — 80053 COMPREHEN METABOLIC PANEL: CPT

## 2018-06-01 PROCEDURE — 84484 ASSAY OF TROPONIN QUANT: CPT

## 2018-06-01 PROCEDURE — 81002 URINALYSIS NONAUTO W/O SCOPE: CPT

## 2018-06-01 PROCEDURE — A9270 NON-COVERED ITEM OR SERVICE: HCPCS | Performed by: EMERGENCY MEDICINE

## 2018-06-01 PROCEDURE — 83690 ASSAY OF LIPASE: CPT

## 2018-06-01 PROCEDURE — 93005 ELECTROCARDIOGRAM TRACING: CPT | Performed by: EMERGENCY MEDICINE

## 2018-06-01 PROCEDURE — 81025 URINE PREGNANCY TEST: CPT

## 2018-06-01 PROCEDURE — A9270 NON-COVERED ITEM OR SERVICE: HCPCS

## 2018-06-01 PROCEDURE — 96375 TX/PRO/DX INJ NEW DRUG ADDON: CPT

## 2018-06-01 PROCEDURE — 96374 THER/PROPH/DIAG INJ IV PUSH: CPT

## 2018-06-01 PROCEDURE — 700111 HCHG RX REV CODE 636 W/ 250 OVERRIDE (IP): Performed by: EMERGENCY MEDICINE

## 2018-06-01 PROCEDURE — 700102 HCHG RX REV CODE 250 W/ 637 OVERRIDE(OP): Performed by: EMERGENCY MEDICINE

## 2018-06-01 PROCEDURE — 99285 EMERGENCY DEPT VISIT HI MDM: CPT

## 2018-06-01 PROCEDURE — 700102 HCHG RX REV CODE 250 W/ 637 OVERRIDE(OP)

## 2018-06-01 RX ORDER — ONDANSETRON 2 MG/ML
4 INJECTION INTRAMUSCULAR; INTRAVENOUS ONCE
Status: COMPLETED | OUTPATIENT
Start: 2018-06-01 | End: 2018-06-01

## 2018-06-01 RX ORDER — SUCRALFATE ORAL 1 G/10ML
SUSPENSION ORAL
Status: COMPLETED
Start: 2018-06-01 | End: 2018-06-01

## 2018-06-01 RX ORDER — ONDANSETRON 4 MG/1
4 TABLET, ORALLY DISINTEGRATING ORAL EVERY 8 HOURS PRN
Qty: 10 TAB | Refills: 0 | Status: SHIPPED | OUTPATIENT
Start: 2018-06-01 | End: 2019-04-26 | Stop reason: CLARIF

## 2018-06-01 RX ORDER — SUCRALFATE ORAL 1 G/10ML
1 SUSPENSION ORAL 4 TIMES DAILY
Qty: 120 ML | Refills: 0 | Status: SHIPPED | OUTPATIENT
Start: 2018-06-01 | End: 2019-04-26 | Stop reason: CLARIF

## 2018-06-01 RX ORDER — SUCRALFATE ORAL 1 G/10ML
1 SUSPENSION ORAL ONCE
Status: COMPLETED | OUTPATIENT
Start: 2018-06-01 | End: 2018-06-01

## 2018-06-01 RX ADMIN — ONDANSETRON 4 MG: 2 INJECTION INTRAMUSCULAR; INTRAVENOUS at 20:55

## 2018-06-01 RX ADMIN — SUCRALFATE ORAL 1 G: 1 SUSPENSION ORAL at 21:47

## 2018-06-01 RX ADMIN — FAMOTIDINE 20 MG: 10 INJECTION, SOLUTION INTRAVENOUS at 21:48

## 2018-06-01 RX ADMIN — SUCRALFATE 1 G: 1 SUSPENSION ORAL at 21:47

## 2018-06-01 RX ADMIN — LIDOCAINE HYDROCHLORIDE 30 ML: 20 SOLUTION OROPHARYNGEAL at 20:55

## 2018-06-01 ASSESSMENT — PAIN SCALES - GENERAL: PAINLEVEL_OUTOF10: 4

## 2018-06-02 NOTE — ED NOTES
"Pt reports a Hx of \"ulcers for years\" with recurring exacerbations.  Today she presents to our facility with a C/O worsening epigastric pain throughout the week.   "

## 2018-06-02 NOTE — ED NOTES
Discharge instructions provided.  Pt verbalized the understanding of discharge instructions to follow up with PCP and to return to ER if condition worsens.  Pt ambulated out of ER without difficulty.

## 2018-06-02 NOTE — ED PROVIDER NOTES
ED Provider Note    CHIEF COMPLAINT  Chief Complaint   Patient presents with   • Epigastric Pain       HPI  Tiffany Gottlieb is a 42 y.o. female who presents with nearly 2 weeks of intermittent epigastric pain radiating to both of her shoulders. Anytime she drinks anything more than water or eats, the pain gets worse. It's been constant since 9:00 this morning despite being nothing by mouth. She hasn't had any fevers. She vomited twice today and has been nauseated off and on for the last 10 days. She denies any changes in her stools. There was no blood in her emesis. She is status post cholecystectomy, gastric bypass surgery, exploratory laparoscopy due to perforated gastric ulcer. She took some ibuprofen yesterday, but she has not been taking it regularly. She denies taking aspirin. She did take Pepto-Bismol and Tums without any relief of her symptoms.    REVIEW OF SYSTEMS  See HPI for further details. All other systems are negative.    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   • Pulmonary embolism (HCC) 2011    no blood thinners anymore   • Arrhythmia     heart murmur when she was a kid   • Breath shortness     no inhalers no oxygen   • Chronic low back pain    • Coughing blood     10/2016   • Degenerative disc disease     lumbar   • Dental disorder     missing teeth top and bottom, 4 molars that are broken in the back   • Fibromyalgia    • History of gastric ulcer     ruptured gastric ulcer, punctured stomach while inpatient, 3 month stay   • Myalgia    • Psychiatric problem     anxiety and depression   • Seizure (HCC)     grand mal- last one 3/2015       FAMILY HISTORY  History reviewed. No pertinent family history.    SOCIAL HISTORY  Social History     Social History   • Marital status:      Spouse name: N/A   • Number of children: N/A   • Years of education: N/A     Social History Main Topics   • Smoking status: Passive Smoke Exposure -  Never Smoker     Packs/day: 2.00     Years: 14.00     Types: Cigarettes   • Smokeless tobacco: Never Used   • Alcohol use No   • Drug use: No   • Sexual activity: Not on file     Other Topics Concern   • Not on file     Social History Narrative   • No narrative on file       SURGICAL HISTORY  Past Surgical History:   Procedure Laterality Date   • THORACOTOMY Right 11/14/2016    Procedure: THORACOTOMY;  Surgeon: John H Ganser, M.D.;  Location: SURGERY [facility];  Service:    • LOBECTOMY  11/14/2016    Procedure: LOBECTOMY middle   ;  Surgeon: John H Ganser, M.D.;  Location: SURGERY [facility];  Service:    • BRONCHOSCOPY  11/14/2016    Procedure: BRONCHOSCOPY  ;  Surgeon: John H Ganser, M.D.;  Location: SURGERY [facility];  Service:    • GASTROSTOMY LAPAROSCOPIC  5/16/2011    Performed by TITI SELLERS at SURGERY [facility]   • GASTROSCOPY  4/28/2011    Performed by BEAN ANDRES at SURGERY [facility]   • GASTROSCOPY  4/17/2011    Performed by RASHI HOFFMAN at SURGERY [facility]   • EXPLORATORY LAPAROTOMY  4/5/2011    Performed by BEAN YOU at SURGERY [facility]   • CHOLECYSTECTOMY     • GASTRIC BYPASS LAPAROSCOPIC      2004   • HYSTERECTOMY LAPAROSCOPY      partial, removed utereus, still has BSO   • PRIMARY C SECTION         CURRENT MEDICATIONS  Home Medications     Reviewed by Kamaljit Meneses R.N. (Registered Nurse) on 06/01/18 at 1937  Med List Status: Complete   Medication Last Dose Status   morphine ER (MS CONTIN) 30 MG Tab CR tablet 6/1/2018 Active   ondansetron (ZOFRAN ODT) 4 MG TABLET DISPERSIBLE 6/1/2018 Active   oxycodone immediate release (ROXICODONE) 10 MG immediate release tablet 6/1/2018 Active   tizanidine (ZANAFLEX) 4 MG Tab 8/19/2017 Active                ALLERGIES  Allergies   Allergen Reactions   • Contrast Media With Iodine [Iodine] Itching     After CT with IV and oral contrast on 4/15/14   • Hydrocodone Itching     Rxn - 2011  Hands itch  "      PHYSICAL EXAM  VITAL SIGNS: /69   Pulse 85   Temp 36.4 °C (97.6 °F)   Resp 18   Ht 1.575 m (5' 2\")   Wt 73 kg (160 lb 15 oz)   SpO2 92%   BMI 29.44 kg/m²  @RACHID[[id number]::@   Constitutional: Well developed, Well nourished, No acute respiratory distress, Non-toxic appearance.   HENT: Normocephalic, Atraumatic, Bilateral external ears normal, Oropharynx clear, mucous membranes are moist.  Eyes: PERRLA, EOMI, Conjunctiva normal, No discharge. No icterus.  Neck: Normal range of motion. Supple, No stridor.   Lymphatic: No cervical lymphadenopathy noted.   Cardiovascular: Normal heart rate, Normal rhythm, No murmurs, No rubs, No gallops.   Thorax & Lungs: Clear to auscultation bilaterally, No respiratory distress, No wheezing.  Abdomen: Soft, scaphoid, normal active bowel sounds, no tenderness to palpation, no masses  Skin: Warm, Dry, No erythema, No rash.   Extremities: Intact distal pulses, No edema, No tenderness  Musculoskeletal: Good range of motion in all major joints. No tenderness to palpation or major deformities noted. Normal gait.  Neurologic: Alert & oriented x 3, cranial nerve and cerebellar exams grossly normal. No focal deficits noted.   Psychiatric: Mildly anxious likely due to pain    EKG  12-lead EKG by my interpretation shows:Sinus bradycardia at 56  Low voltage, extremity and precordial leads  normal axis  No ST or T-wave changes to indicate ischemia or infarct  Compared to ECG 12/12/2016 20:30:20  Sinus rhythm no longer present    COURSE & MEDICAL DECISION MAKING  Pertinent Labs & Imaging studies reviewed. (See chart for details)  Differential diagnosis may include gastritis, ulcer disease, hepatitis, pancreatitis, pericarditis, MI.    Results for orders placed or performed during the hospital encounter of 06/01/18   CBC WITH DIFFERENTIAL   Result Value Ref Range    WBC 7.3 4.8 - 10.8 K/uL    RBC 4.78 4.20 - 5.40 M/uL    Hemoglobin 10.9 (L) 12.0 - 16.0 g/dL    Hematocrit 36.0 (L) 37.0 " - 47.0 %    MCV 75.3 (L) 81.4 - 97.8 fL    MCH 22.8 (L) 27.0 - 33.0 pg    MCHC 30.3 (L) 33.6 - 35.0 g/dL    RDW 43.2 35.9 - 50.0 fL    Platelet Count 305 164 - 446 K/uL    MPV 9.2 9.0 - 12.9 fL    Neutrophils-Polys 67.20 44.00 - 72.00 %    Lymphocytes 23.60 22.00 - 41.00 %    Monocytes 6.70 0.00 - 13.40 %    Eosinophils 2.00 0.00 - 6.90 %    Basophils 0.40 0.00 - 1.80 %    Immature Granulocytes 0.10 0.00 - 0.90 %    Nucleated RBC 0.00 /100 WBC    Neutrophils (Absolute) 4.92 2.00 - 7.15 K/uL    Lymphs (Absolute) 1.73 1.00 - 4.80 K/uL    Monos (Absolute) 0.49 0.00 - 0.85 K/uL    Eos (Absolute) 0.15 0.00 - 0.51 K/uL    Baso (Absolute) 0.03 0.00 - 0.12 K/uL    Immature Granulocytes (abs) 0.01 0.00 - 0.11 K/uL    NRBC (Absolute) 0.00 K/uL   COMP METABOLIC PANEL   Result Value Ref Range    Sodium 138 135 - 145 mmol/L    Potassium 3.2 (L) 3.6 - 5.5 mmol/L    Chloride 106 96 - 112 mmol/L    Co2 25 20 - 33 mmol/L    Anion Gap 7.0 0.0 - 11.9    Glucose 62 (L) 65 - 99 mg/dL    Bun 12 8 - 22 mg/dL    Creatinine 0.74 0.50 - 1.40 mg/dL    Calcium 8.5 8.4 - 10.2 mg/dL    AST(SGOT) 23 12 - 45 U/L    ALT(SGPT) 19 2 - 50 U/L    Alkaline Phosphatase 50 30 - 99 U/L    Total Bilirubin 0.5 0.1 - 1.5 mg/dL    Albumin 3.6 3.2 - 4.9 g/dL    Total Protein 6.3 6.0 - 8.2 g/dL    Globulin 2.7 1.9 - 3.5 g/dL    A-G Ratio 1.3 g/dL   LIPASE   Result Value Ref Range    Lipase 19 7 - 58 U/L   TROPONIN   Result Value Ref Range    Troponin I <0.02 0.00 - 0.04 ng/mL   ESTIMATED GFR   Result Value Ref Range    GFR If African American >60 >60 mL/min/1.73 m 2    GFR If Non African American >60 >60 mL/min/1.73 m 2   POC UA   Result Value Ref Range    POC Color Yellow     POC Appearance Clear     POC Glucose Negative Negative mg/dL    POC Ketones Trace (A) Negative mg/dL    POC Specific Gravity 1.015 1.005 - 1.030    POC Blood Moderate (A) Negative    POC Urine PH 5.0 5.0 - 8.0    POC Protein Negative Negative mg/dL    POC Nitrites Negative Negative    POC  Leukocyte Esterase Negative Negative   POC URINE PREGNANCY   Result Value Ref Range    POC Urine Pregnancy Test Negative    EKG (NOW)   Result Value Ref Range    Report       [facility]    Test Date:  2018  Pt Name:    RENETTA LYONS                 Department: [facility]  MRN:        [id number]                      [facility]  Gender:     Female                       Technician: TASNEEM  :        [id number]                   Requested By:YENNIFER ANTHONY  Order #:    [id number]                    Reading MD: YENNIFER ANTHONY MD    Measurements  Intervals                                Axis  Rate:       55                           P:          10  MI:         141                          QRS:        16  QRSD:       74                           T:          12  QT:         445  QTc:        426    Interpretive Statements  Sinus bradycardia at 56  Low voltage, extremity and precordial leads  normal axis  No ST or T-wave changes to indicate ischemia or infarct  Compared to ECG 2016 20:30:20  Sinus rhythm no longer present    Electronically Signed On 2018 21:33:46 PDT by YENNIFER ANTHONY MD        9:34 PM patient got Zofran for nausea and then a GI cocktail. She states the nausea is gone and she had temporary relief with the GI cocktail. It does not appear that her hemoglobin is any lower than it usually is, and her BUN is not high, so I doubt that she is having an ongoing GI bleed. She is given IV Pepcid and by mouth Carafate here in the emergency department prior to discharge.     The patient will return for new or worsening symptoms and is stable at the time of discharge.    DISPOSITION:  Patient will be discharged home in stable condition.    FOLLOW UP:  Reji Mullen M.D.  [address] [place] [id number]  [phone]            OUTPATIENT MEDICATIONS:  New Prescriptions    ESOMEPRAZOLE (NEXIUM) 20 MG CAPSULE    Take 1 Cap by mouth 2 times  daily, before breakfast and dinner.    SUCRALFATE (CARAFATE) 1 GM/10ML SUSPENSION    Take 10 mL by mouth 4 times a day.   Zofran      FINAL IMPRESSION  1. Chronic gastritis without bleeding, unspecified gastritis type               Electronically signed by: Arelis Mancini, 6/1/2018 8:46 PM

## 2018-06-02 NOTE — DISCHARGE INSTRUCTIONS
Gastritis, Adult  Gastritis is soreness and puffiness (inflammation) of the lining of the stomach. If you do not get help, gastritis can cause bleeding and sores (ulcers) in the stomach.  HOME CARE   · Only take medicine as told by your doctor.  · If you were given antibiotic medicines, take them as told. Finish the medicines even if you start to feel better.  · Drink enough fluids to keep your pee (urine) clear or pale yellow.  · Avoid foods and drinks that make your problems worse. Foods you may want to avoid include:  ¨ Caffeine or alcohol.  ¨ Chocolate.  ¨ Mint.  ¨ Garlic and onions.  ¨ Spicy foods.  ¨ Citrus fruits, including oranges, maria victoria, or limes.  ¨ Food containing tomatoes, including sauce, chili, salsa, and pizza.  ¨ Fried and fatty foods.  · Eat small meals throughout the day instead of large meals.  GET HELP RIGHT AWAY IF:   · You have black or dark red poop (stools).  · You throw up (vomit) blood. It may look like coffee grounds.  · You cannot keep fluids down.  · Your belly (abdominal) pain gets worse.  · You have a fever.  · You do not feel better after 1 week.  · You have any other questions or concerns.  MAKE SURE YOU:   · Understand these instructions.  · Will watch your condition.  · Will get help right away if you are not doing well or get worse.  This information is not intended to replace advice given to you by your health care provider. Make sure you discuss any questions you have with your health care provider.  Document Released: 06/05/2009 Document Revised: 03/11/2013 Document Reviewed: 09/10/2016  [employer] Interactive Patient Education © 2017 [employer]

## 2019-04-26 ENCOUNTER — APPOINTMENT (OUTPATIENT)
Dept: RADIOLOGY | Facility: MEDICAL CENTER | Age: 44
End: 2019-04-26
Attending: EMERGENCY MEDICINE
Payer: COMMERCIAL

## 2019-04-26 ENCOUNTER — HOSPITAL ENCOUNTER (EMERGENCY)
Facility: MEDICAL CENTER | Age: 44
End: 2019-04-26
Attending: EMERGENCY MEDICINE
Payer: COMMERCIAL

## 2019-04-26 VITALS
TEMPERATURE: 98 F | RESPIRATION RATE: 18 BRPM | HEART RATE: 56 BPM | HEIGHT: 62 IN | DIASTOLIC BLOOD PRESSURE: 73 MMHG | SYSTOLIC BLOOD PRESSURE: 129 MMHG | WEIGHT: 176.37 LBS | BODY MASS INDEX: 32.46 KG/M2 | OXYGEN SATURATION: 99 %

## 2019-04-26 DIAGNOSIS — S09.90XA CLOSED HEAD INJURY, INITIAL ENCOUNTER: ICD-10-CM

## 2019-04-26 DIAGNOSIS — S39.012A LUMBOSACRAL STRAIN, INITIAL ENCOUNTER: ICD-10-CM

## 2019-04-26 PROCEDURE — 99284 EMERGENCY DEPT VISIT MOD MDM: CPT

## 2019-04-26 PROCEDURE — 700111 HCHG RX REV CODE 636 W/ 250 OVERRIDE (IP): Performed by: EMERGENCY MEDICINE

## 2019-04-26 PROCEDURE — 96372 THER/PROPH/DIAG INJ SC/IM: CPT

## 2019-04-26 PROCEDURE — 70450 CT HEAD/BRAIN W/O DYE: CPT

## 2019-04-26 PROCEDURE — 72100 X-RAY EXAM L-S SPINE 2/3 VWS: CPT

## 2019-04-26 RX ORDER — KETOROLAC TROMETHAMINE 30 MG/ML
60 INJECTION, SOLUTION INTRAMUSCULAR; INTRAVENOUS ONCE
Status: COMPLETED | OUTPATIENT
Start: 2019-04-26 | End: 2019-04-26

## 2019-04-26 RX ADMIN — KETOROLAC TROMETHAMINE 60 MG: 30 INJECTION INTRAMUSCULAR; INTRAVENOUS at 13:11

## 2019-04-26 NOTE — LETTER
"  FORM C-4:  EMPLOYEE’S CLAIM FOR COMPENSATION/ REPORT OF INITIAL TREATMENT  EMPLOYEE’S CLAIM - PROVIDE ALL INFORMATION REQUESTED   First Name  Tiffany Last Name  Bull Birthdate             Age  1975 43 y.o. Sex  female Claim Number   Home Employee Address  [address] [facility] [place]  [place]                                     Zip  [address] Height  1.575 m (5' 2\") Weight  80 kg (176 lb 5.9 oz) [facility]     Mailing Employee Address                           [address] [facility] [place]   [place]               Zip  [address] Telephone  [phone] (home)  Primary Language Spoken  ENGLISH   Insurer  [employer]   FRIDA MARTINEZ Employee's Occupation (Job Title) When Injury or Occupational Disease Occurred     Employer's Name  [employer] [facility] [employer] Telephone  [phone]    Employer Address  [address]] Zip  [address]   Date of Injury        04/24/2019 Hour of Injury  16:30 Date Employer Notified  04/25/2019 Last Day of Work after Injury or Occupational Disease  04/25/2019 Supervisor to Whom Injury Reported  Maxime   Address or Location of Accident (if applicable)  [[facility]]   What were you doing at the time of accident? (if applicable)  Driving the       How did this injury or occupational disease occur? Be specific and answer in detail. Use additional sheet if necessary)  Driving backwards on the  Hit the rack and flew off     If you believe that you have an occupational disease, when did you first have knowledge of the disability and it relationship to your employment?  N/A   Witnesses to the Accident    Zanity   Nature of Injury or Occupational Disease   Workers Compensation Part(s) of Body Injured or Affected  Back of Head, neck, and back.   I certify that the above is true and correct to the best of my knowledge and that I have provided this information in " order to obtain the benefits of [place]  I hereby authorize any physician, chiropractor, surgeon, practitioner, or other person, any hospital, including [facility] or [facility] hospital, any medical service organization, any insurance company, or other institution or organization to release to each other, any medical or other information, including benefits paid or payable, pertinent to this injury or disease, except information relative to diagnosis, treatment and/or counseling for AIDS, psychological conditions, alcohol or controlled substances, for which I must give specific authorization.  A Photostat of this authorization shall be as valid as the original.   Date Place   Employee’s Signature   THIS REPORT MUST BE COMPLETED AND MAILED WITHIN 3 WORKING DAYS OF TREATMENT   Place  [facility], EMERGENCY DEPT  Name of Facility   [facility]   Date  4/26/2019 Diagnosis  (S09.90XA) Closed head injury, initial encounter  (S39.012A) Lumbosacral strain, initial encounter Is there evidence the injured employee was under the influence of alcohol and/or another controlled substance at the time of accident?   Hour  1:12 PM Description of Injury or Disease  Closed head injury, initial encounter  Lumbosacral strain, initial encounter No   Treatment  Physician evaluation and treatment of head injury and low back strain  Have you advised the patient to remain off work five days or more?         No   X-Ray Findings  Negative  Comments:Lumbosacral radiographs negative CT scan of the head negative   If Yes   From Date    To Date      From information given by the employee, together with medical evidence, can you directly connect this injury or occupational disease as job incurred?  Yes If No, is the employee capable of: Full Duty  No Modified Duty  Yes   Is  "additional medical care by a physician indicated?  Yes  Comments:Follow-up with occupational health as needed If Modified Duty, Specify any Limitations / Restrictions  No heavy lifting more than 10 pounds     Do you know of any previous injury or disease contributing to this condition or occupational disease?  No   Date  4/26/2019 Print Doctor’s Name  Lance Gunn certify the employer’s copy of this form was mailed on:   Address  [address] [address] [phone] Insurer’s Use Only   [place] [facility]  [address]    Provider’s Tax ID Number  [id number] Telephone  Dept: [phone]    Doctor’s Signature  e-LANCE Bauman M.D. Degree   MD    Original - TREATING PHYSICIAN OR CHIROPRACTOR   Pg 2-Insurer/TPA   Pg 3-Employer   Pg 4-Employee                                                                                                  Form C-4 (rev01/03)     BRIEF DESCRIPTION OF RIGHTS AND BENEFITS  (Pursuant to [place] 616C.050)    Notice of Injury or Occupational Disease (Incident Report Form C-1): If an injury or occupational disease (OD) arises out of and in the course of employment, you must provide written notice to your employer as soon as practicable, but no later than 7 days after the accident or OD. Your employer shall maintain a sufficient supply of the required forms.    Claim for Compensation (Form C-4): If medical treatment is sought, the form C-4 is available at the place of initial treatment. A completed \"Claim for Compensation\" (Form C-4) must be filed within 90 days after an accident or OD. The treating physician or chiropractor must, within 3 working days after treatment, complete and mail to the employer, the employer's insurer and third-party , the Claim for Compensation.    Medical Treatment: If you require medical treatment for your on-the-job injury or OD, you may be required to select a physician or chiropractor from a list provided by your " workers’ compensation insurer, if it has contracted with an Organization for Managed Care (MCO) or Preferred Provider Organization (PPO) or providers of health care. If your employer has not entered into a contract with an MCO or PPO, you may select a physician or chiropractor from the Panel of Physicians and Chiropractors. Any medical costs related to your industrial injury or OD will be paid by your insurer.    Temporary Total Disability (TTD): If your doctor has certified that you are unable to work for a period of at least 5 consecutive days, or 5 cumulative days in a 20-day period, or places restrictions on you that your employer does not accommodate, you may be entitled to TTD compensation.    Temporary Partial Disability (TPD): If the wage you receive upon reemployment is less than the compensation for TTD to which you are entitled, the insurer may be required to pay you TPD compensation to make up the difference. TPD can only be paid for a maximum of 24 months.    Permanent Partial Disability (PPD): When your medical condition is stable and there is an indication of a PPD as a result of your injury or OD, within 30 days, your insurer must arrange for an evaluation by a rating physician or chiropractor to determine the degree of your PPD. The amount of your PPD award depends on the date of injury, the results of the PPD evaluation and your age and wage.    Permanent Total Disability (PTD): If you are medically certified by a treating physician or chiropractor as permanently and totally disabled and have been granted a PTD status by your insurer, you are entitled to receive monthly benefits not to exceed 66 2/3% of your average monthly wage. The amount of your PTD payments is subject to reduction if you previously received a PPD award.    Vocational Rehabilitation Services: You may be eligible for vocational rehabilitation services if you are unable to return to the job due to a permanent physical impairment  or permanent restrictions as a result of your injury or occupational disease.    Transportation and Per Sarah Reimbursement: You may be eligible for travel expenses and per sarah associated with medical treatment.  Reopening: You may be able to reopen your claim if your condition worsens after claim closure.    Appeal Process: If you disagree with a written determination issued by the insurer or the insurer does not respond to your request, you may appeal to the Department of Administration, , by following the instructions contained in your determination letter. You must appeal the determination within 70 days from the date of the determination letter at [address], [address], [place], [place] [address], or [address], [address], [place], [place] [address]. If you disagree with the  decision, you may appeal to the Department of Administration, . You must file your appeal within 30 days from the date of the  decision letter at [address], [address], [place], [place] [address], or [address], [address], [place], [place] [address]. If you disagree with a decision of an , you may file a petition for judicial review with the District Court. You must do so within 30 days of the Appeal Officer’s decision. You may be represented by an  at your own expense or you may contact the [facility] for possible representation.    [place]  for Injured Workers (NAIW): If you disagree with a  decision, you may request that NAIW represent you without charge at an  Hearing. For information regarding denial of benefits, you may contact the [facility] at: [address], [address], [place] [address], [phone], or [address], [address], [place] [address], [phone]    To File a Complaint with the Division: If you wish to file a complaint with the  of the  Division of Industrial Relations (DIR), please contact the Workers’ Compensation Section, [address], [address], [place], [place] [address], telephone [phone]) [phone], or [address], [address], [place], [place] [address], telephone [phone].    For assistance with Workers’ Compensation Issues: you may contact the Office of the [facility] Consumer Health Assistance, [address] [place] [address], [address], [place], [place] [address], Toll Free 1-103.179.3702, Web site: http://govcha.[facility].nv., E-mail [email or web address].                                                                                                                                                                               __________________________________________________________________                                    _________________            Employee Name / Signature                                                                                                                            Date                                       D-2 (rev. 10/07)

## 2019-04-26 NOTE — ED PROVIDER NOTES
ED Provider Note    CHIEF COMPLAINT  Chief Complaint   Patient presents with   • T-5000 FALL   • Low Back Pain       HPI  Tiffany Gottlieb is a 43 y.o. female who presents for evaluation of a work-related injury.  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  She fell backwards struck her head and her low back.  Ever since then she has been having worsening chronic low back pain but also headache, intermittent ataxia lightheadedness.  She did not seek any medical attention yet.  She is on pain medicine but has not used any additional medicines.  She reports possible brief loss of consciousness no bleeding from the ears or nose.  She is not on any anticoagulant therapy.  No other complaints reported such as incontinence numbness weakness or tingling    REVIEW OF SYSTEMS  See HPI for further details.  Positive for brief loss of consciousness headache back pain all other systems are negative.     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   • Pulmonary embolism (HCC) 2011    no blood thinners anymore   • Arrhythmia     heart murmur when she was a kid   • Breath shortness     no inhalers no oxygen   • Chronic low back pain    • Coughing blood     10/2016   • Degenerative disc disease     lumbar   • Dental disorder     missing teeth top and bottom, 4 molars that are broken in the back   • Fibromyalgia    • History of gastric ulcer     ruptured gastric ulcer, punctured stomach while inpatient, 3 month stay   • Myalgia    • Psychiatric problem     anxiety and depression   • Seizure (HCC)     grand mal- last one 3/2015       FAMILY HISTORY  No history of bleeding disorder    SOCIAL HISTORY  Social History     Social History   • Marital status:      Spouse name: N/A   • Number of children: N/A   • Years of education: N/A     Social History Main Topics   • Smoking status: Passive Smoke Exposure - Never Smoker      Packs/day: 2.00     Years: 14.00     Types: Cigarettes   • Smokeless tobacco: Never Used   • Alcohol use No   • Drug use: No   • Sexual activity: Not on file     Other Topics Concern   • Not on file     Social History Narrative   • No narrative on file   IV drug    SURGICAL HISTORY  Past Surgical History:   Procedure Laterality Date   • THORACOTOMY Right 11/14/2016    Procedure: THORACOTOMY;  Surgeon: John H Ganser, M.D.;  Location: SURGERY [facility];  Service:    • LOBECTOMY  11/14/2016    Procedure: LOBECTOMY middle   ;  Surgeon: John H Ganser, M.D.;  Location: SURGERY [facility];  Service:    • BRONCHOSCOPY  11/14/2016    Procedure: BRONCHOSCOPY  ;  Surgeon: John H Ganser, M.D.;  Location: SURGERY [facility];  Service:    • GASTROSTOMY LAPAROSCOPIC  5/16/2011    Performed by TITI SELLERS at SURGERY [facility]   • GASTROSCOPY  4/28/2011    Performed by BEAN ANDRES at SURGERY [facility] ORS   • GASTROSCOPY  4/17/2011    Performed by RASHI HOFFMAN at SURGERY [facility] ORS   • EXPLORATORY LAPAROTOMY  4/5/2011    Performed by BEAN YOU at SURGERY [facility] ORS   • CHOLECYSTECTOMY     • GASTRIC BYPASS LAPAROSCOPIC      2004   • HYSTERECTOMY LAPAROSCOPY      partial, removed utereus, still has BSO   • PRIMARY C SECTION         CURRENT MEDICATIONS  No current facility-administered medications for this encounter.     Current Outpatient Prescriptions:   •  Multiple Vitamin (MULTI VITAMIN PO), Take 1 Tab by mouth every day., Disp: , Rfl:   •  asa/apap/caffeine (EXCEDRIN) 250-250-65 MG Tab, Take 1 Tab by mouth every 6 hours as needed for Headache., Disp: , Rfl:   •  oxycodone immediate release (ROXICODONE) 10 MG immediate release tablet, Take 10 mg by mouth every four hours as needed for Moderate Pain., Disp: , Rfl:   •  morphine ER (MS CONTIN) 30 MG Tab CR tablet, Take 30 mg by mouth every 8 hours as needed., Disp: , Rfl:   •  tizanidine (ZANAFLEX) 4 MG Tab, Take 8 mg by mouth every  "bedtime., Disp: , Rfl:     ALLERGIES  Allergies   Allergen Reactions   • Contrast Media With Iodine [Iodine] Itching     After CT with IV and oral contrast on 4/15/14   • Hydrocodone Itching     Rxn - 2011  Hands itch       PHYSICAL EXAM  VITAL SIGNS: /77   Pulse 66   Temp 36.3 °C (97.4 °F) (Temporal)   Resp 18   Ht 1.575 m (5' 2\")   Wt 80 kg (176 lb 5.9 oz)   SpO2 99%   BMI 32.26 kg/m²       Constitutional: Well developed, Well nourished, No acute distress, Non-toxic appearance.   HENT: No hemotympanum negative mak sign and small hematoma noted over the occiput no laceration no skull step-off ic, Bilateral external ears normal, Oropharynx moist, No oral exudates, Nose normal.   Eyes: PERRLA, EOMI, Conjunctiva normal, No discharge.   Neck: Normal range of motion, No tenderness, Supple, No stridor.   Cardiovascular: Normal heart rate, Normal rhythm, No murmurs, No rubs, No gallops.   Thorax & Lungs: Normal breath sounds, No respiratory distress, No wheezing, No chest tenderness.   Abdomen: Bowel sounds normal, Soft, No tenderness, No masses, No pulsatile masses.   Skin: Warm, Dry, No erythema, No rash.   Back: Midline bony tenderness at L3-L4 without step-off o CVA tenderness.   Extremities: Intact distal pulses, No edema, No tenderness, No cyanosis, No clubbing.   Neurologic: Alert & oriented x 3, Normal motor function, Normal sensory function, No focal deficits noted.   Psychiatric: Anxious    DX-LUMBAR SPINE-2 OR 3 VIEWS   Final Result      No evidence of lumbar fracture or significant degenerative disk disease.      CT-HEAD W/O   Final Result      No acute intracranial abnormality is identified.          COURSE & MEDICAL DECISION MAKING  Pertinent Labs & Imaging studies reviewed. (See chart for details)  Patient presents for 36 hours removed from acute close head injury with lumbar sprain.  Differential diagnosis includes skull fracture intracranial hemorrhage subdural hemorrhage spinal fracture " spinal listhesis.  The patient has no acute process on CT scan or radiographs of the back.  Neurologically she is intact.  This is a workers related injury and I will put her in a concussion protocol.  She alread has pain medication prescription    FINAL IMPRESSION  1.  Minor head injury  2.  Acute lumbar strain         Electronically signed by: Lance Gunn, 4/26/2019 11:38 AM

## 2019-04-26 NOTE — ED NOTES
"Pt presents with a medical  hx that includes chronic low back pain, experiencing recurrent exacerbations, that make her walking difficult at times.  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  Chief Complaint   Patient presents with   • T-5000 FALL   • Low Back Pain     /77   Pulse 66   Temp 36.3 °C (97.4 °F) (Temporal)   Resp 18   Ht 1.575 m (5' 2\")   Wt 80 kg (176 lb 5.9 oz)   SpO2 99%   BMI 32.26 kg/m²     "

## 2019-04-26 NOTE — LETTER
"  FORM C-4:  EMPLOYEE’S CLAIM FOR COMPENSATION/ REPORT OF INITIAL TREATMENT  EMPLOYEE’S CLAIM - PROVIDE ALL INFORMATION REQUESTED   First Name  Tiffany Last Name  Bull Birthdate             Age  1975 43 y.o. Sex  female Claim Number   Home Employee Address  [address]  [place]                                     [place]  [address] Height  1.575 m (5' 2\") Weight  80 kg (176 lb 5.9 oz) [facility]  [id number]   Mailing Employee Address                           [address]   [place]               Zip  [address] Telephone  [phone] (home)  Primary Language Spoken  ENGLISH   Insurer  *** Third Party   EUGENE/MARCELO MARTINEZ Employee's Occupation (Job Title) When Injury or Occupational Disease Occurred     Employer's Name  [employer] [facility] [employer] Telephone  [phone]    Employer Address  [address]] Zip  [address]   Date of Injury         Hour of Injury   Date Employer Notified   Last Day of Work after Injury or Occupational Disease   Supervisor to Whom Injury Reported     Address or Location of Accident (if applicable)  [Warehouse]   What were you doing at the time of accident? (if applicable)      How did this injury or occupational disease occur? Be specific and answer in detail. Use additional sheet if necessary)     If you believe that you have an occupational disease, when did you first have knowledge of the disability and it relationship to your employment?   Witnesses to the Accident       Nature of Injury or Occupational Disease    Part(s) of Body Injured or Affected  , ,     I certify that the above is true and correct to the best of my knowledge and that I have provided this information in order to obtain the benefits of [place] inclusive or Chapter 617 of [place]RS).  I hereby authorize any physician, chiropractor, surgeon, " practitioner, or other person, any hospital, including [facility], any medical service organization, any insurance company, or other institution or organization to release to each other, any medical or other information, including benefits paid or payable, pertinent to this injury or disease, except information relative to diagnosis, treatment and/or counseling for AIDS, psychological conditions, alcohol or controlled substances, for which I must give specific authorization.  A Photostat of this authorization shall be as valid as the original.   Date Place   Employee’s Signature   THIS REPORT MUST BE COMPLETED AND MAILED WITHIN 3 WORKING DAYS OF TREATMENT   Place  [facility], EMERGENCY DEPT  Name of Facility   [facility]   Date  4/26/2019 Diagnosis  No diagnosis found. Is there evidence the injured employee was under the influence of alcohol and/or another controlled substance at the time of accident?   Hour  12:43 PM Description of Injury or Disease       Treatment     Have you advised the patient to remain off work five days or more?             X-Ray Findings      If Yes   From Date    To Date      From information given by the employee, together with medical evidence, can you directly connect this injury or occupational disease as job incurred?    If No, is the employee capable of: Full Duty    Modified Duty      Is additional medical care by a physician indicated?    If Modified Duty, Specify any Limitations / Restrictions        Do you know of any previous injury or disease contributing to this condition or occupational disease?      Date  4/26/2019 Print Doctor’s Name  Lance Gunn I certify the employer’s copy of this form was mailed on:   Address  [address] [place] [id number] [phone] Insurer’s Use Only   [place] [facility]  [address]    Provider’s Tax ID Number  [id number] Telephone  Dept:  "[phone]    Doctor’s Signature    Degree       Original - TREATING PHYSICIAN OR CHIROPRACTOR   Pg 2-Insurer/TPA   Pg 3-Employer   Pg 4-Employee                                                                                                  Form C-4 (rev01/03)     BRIEF DESCRIPTION OF RIGHTS AND BENEFITS  (Pursuant to NRS 616C.050)    Notice of Injury or Occupational Disease (Incident Report Form C-1): If an injury or occupational disease (OD) arises out of and in the course of employment, you must provide written notice to your employer as soon as practicable, but no later than 7 days after the accident or OD. Your employer shall maintain a sufficient supply of the required forms.    Claim for Compensation (Form C-4): If medical treatment is sought, the form C-4 is available at the place of initial treatment. A completed \"Claim for Compensation\" (Form C-4) must be filed within 90 days after an accident or OD. The treating physician or chiropractor must, within 3 working days after treatment, complete and mail to the employer, the employer's insurer and third-party , the Claim for Compensation.    Medical Treatment: If you require medical treatment for your on-the-job injury or OD, you may be required to select a physician or chiropractor from a list provided by your workers’ compensation insurer, if it has contracted with an Organization for Managed Care (MCO) or Preferred Provider Organization (PPO) or providers of health care. If your employer has not entered into a contract with an MCO or PPO, you may select a physician or chiropractor from the Panel of Physicians and Chiropractors. Any medical costs related to your industrial injury or OD will be paid by your insurer.    Temporary Total Disability (TTD): If your doctor has certified that you are unable to work for a period of at least 5 consecutive days, or 5 cumulative days in a 20-day period, or places restrictions on you that your employer " does not accommodate, you may be entitled to TTD compensation.    Temporary Partial Disability (TPD): If the wage you receive upon reemployment is less than the compensation for TTD to which you are entitled, the insurer may be required to pay you TPD compensation to make up the difference. TPD can only be paid for a maximum of 24 months.    Permanent Partial Disability (PPD): When your medical condition is stable and there is an indication of a PPD as a result of your injury or OD, within 30 days, your insurer must arrange for an evaluation by a rating physician or chiropractor to determine the degree of your PPD. The amount of your PPD award depends on the date of injury, the results of the PPD evaluation and your age and wage.    Permanent Total Disability (PTD): If you are medically certified by a treating physician or chiropractor as permanently and totally disabled and have been granted a PTD status by your insurer, you are entitled to receive monthly benefits not to exceed 66 2/3% of your average monthly wage. The amount of your PTD payments is subject to reduction if you previously received a PPD award.    Vocational Rehabilitation Services: You may be eligible for vocational rehabilitation services if you are unable to return to the job due to a permanent physical impairment or permanent restrictions as a result of your injury or occupational disease.    Transportation and Per Sarah Reimbursement: You may be eligible for travel expenses and per sarah associated with medical treatment.  Reopening: You may be able to reopen your claim if your condition worsens after claim closure.    Appeal Process: If you disagree with a written determination issued by the insurer or the insurer does not respond to your request, you may appeal to the Department of Administration, , by following the instructions contained in your determination letter. You must appeal the determination within 70 days from the date  of the determination letter at [address], [address], [place], [place] [address], or [address], [address], [place], [place] [address]. If you disagree with the  decision, you may appeal to the Department of Administration, . You must file your appeal within 30 days from the date of the  decision letter at [address], [address], [place], [place] [address], or [address], [address], [place], [place] [address]. If you disagree with a decision of an , you may file a petition for judicial review with the District Court. You must do so within 30 days of the Appeal Officer’s decision. You may be represented by an  at your own expense or you may contact the [facility] for possible representation.    [place]  for Injured Workers (NAIW): If you disagree with a  decision, you may request that NAIW represent you without charge at an  Hearing. For information regarding denial of benefits, you may contact the [facility] at: [address], [address], [place] [address], [phone], or [address], [place] [address], [phone]    To File a Complaint with the Division: If you wish to file a complaint with the  of the Division of Industrial Relations (DIR), please contact the Workers’ Compensation Section, [address], [address], [place], [place] [address], telephone [phone], or [address] [facility] [address], [place] [address], telephone [phone].    For assistance with Workers’ Compensation Issues: you may contact the Office of the Governor Consumer Health Assistance, [address], [address], [place], [place] [address], Toll Free 1-562.665.7729, Web site: http://govActX.[facility].nv., E-mail risa@govActX.[facility] [place].                                                                                                                                                                                __________________________________________________________________                                    _________________            Employee Name / Signature                                                                                                                            Date                                       D-2 (rev. 10/07)

## 2019-04-26 NOTE — LETTER
"  FORM C-4:  EMPLOYEE’S CLAIM FOR COMPENSATION/ REPORT OF INITIAL TREATMENT  EMPLOYEE’S CLAIM - PROVIDE ALL INFORMATION REQUESTED   First Name  Tiffany Last Name  Bull Birthdate             Age  1975 43 y.o. Sex  female Claim Number   Home Employee Address  [address]  [place]                                     Zip  [address] Height  1.575 m (5' 2\") Weight  80 kg (176 lb 5.9 oz) N  [id number]   Mailing Employee Address                           [address]   [place]               Zip  [address] Telephone  [phone] (home)  Primary Language Spoken  ENGLISH   Insurer  *** Third Party   EUGENE/MARCELO MARTINEZ Employee's Occupation (Job Title) When Injury or Occupational Disease Occurred     Employer's Name  [employer] 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 (if applicable)      How did this injury or occupational disease occur? Be specific and answer in detail. Use additional sheet if necessary)     If you believe that you have an occupational disease, when did you first have knowledge of the disability and it relationship to your employment?   Witnesses to the Accident       Nature of Injury or Occupational Disease    Part(s) of Body Injured or Affected  , ,     I certify that the above is true and correct to the best of my knowledge and that I have provided this information in order to obtain the benefits of [place]’s [place] ([place] 616A to [place]D, inclusive or Chapter 617 of [place]RS).  I hereby authorize any physician, " chiropractor, surgeon, practitioner, or other person, any hospital, including [facility], any medical service organization, any insurance company, or other institution or organization to release to each other, any medical or other information, including benefits paid or payable, pertinent to this injury or disease, except information relative to diagnosis, treatment and/or counseling for AIDS, psychological conditions, alcohol or controlled substances, for which I must give specific authorization.  A Photostat of this authorization shall be as valid as the original.   Date Place   Employee’s Signature   THIS REPORT MUST BE COMPLETED AND MAILED WITHIN 3 WORKING DAYS OF TREATMENT   Place  [facility], EMERGENCY DEPT  Name of Facility   [facility]   Date  4/26/2019 Diagnosis  (S09.90XA) Closed head injury, initial encounter  (S39.012A) Lumbosacral strain, initial encounter Is there evidence the injured employee was under the influence of alcohol and/or another controlled substance at the time of accident?   Hour  1:18 PM Description of Injury or Disease  Closed head injury, initial encounter  Lumbosacral strain, initial encounter No   Treatment  Physician evaluation and treatment of head injury and low back strain  Have you advised the patient to remain off work five days or more?         No   X-Ray Findings  Negative  Comments:Lumbosacral radiographs negative CT scan of the head negative   If Yes   From Date    To Date      From information given by the employee, together with medical evidence, can you directly connect this injury or occupational disease as job incurred?  Yes If No, is the employee capable of: Full Duty  No Modified Duty  Yes   Is additional medical care by a physician indicated?  Yes  Comments:Follow-up with occupational health as needed If Modified Duty, Specify any Limitations / Restrictions  No heavy lifting more  "than 10 pounds     Do you know of any previous injury or disease contributing to this condition or occupational disease?  No   Date  4/26/2019 Print Doctor’s Name  Lance Gunn certify the employer’s copy of this form was mailed on:   Address  [address] [place] [address] [phone] Insurer’s Use Only   [facility]  [address]    Provider’s Tax ID Number  [id number] Telephone  Dept: [phone]    Doctor’s Signature  e-LANCE Bauman M.D. Degree       Original - TREATING PHYSICIAN OR CHIROPRACTOR   Pg 2-Insurer/TPA   Pg 3-Employer   Pg 4-Employee                                                                                                  Form C-4 (rev01/03)     BRIEF DESCRIPTION OF RIGHTS AND BENEFITS  ([place]ursuant to [place]RS 616C.050)    Notice of Injury or Occupational Disease (Incident Report Form C-1): If an injury or occupational disease (OD) arises out of and in the course of employment, you must provide written notice to your employer as soon as practicable, but no later than 7 days after the accident or OD. Your employer shall maintain a sufficient supply of the required forms.    Claim for Compensation (Form C-4): If medical treatment is sought, the form C-4 is available at the place of initial treatment. A completed \"Claim for Compensation\" (Form C-4) must be filed within 90 days after an accident or OD. The treating physician or chiropractor must, within 3 working days after treatment, complete and mail to the employer, the employer's insurer and third-party , the Claim for Compensation.    Medical Treatment: If you require medical treatment for your on-the-job injury or OD, you may be required to select a physician or chiropractor from a list provided by your workers’ compensation insurer, if it has contracted with an Organization for Managed Care (MCO) or Preferred Provider Organization (PPO) or providers of health care. If your employer has not " entered into a contract with an MCO or PPO, you may select a physician or chiropractor from the Panel of Physicians and Chiropractors. Any medical costs related to your industrial injury or OD will be paid by your insurer.    Temporary Total Disability (TTD): If your doctor has certified that you are unable to work for a period of at least 5 consecutive days, or 5 cumulative days in a 20-day period, or places restrictions on you that your employer does not accommodate, you may be entitled to TTD compensation.    Temporary Partial Disability (TPD): If the wage you receive upon reemployment is less than the compensation for TTD to which you are entitled, the insurer may be required to pay you TPD compensation to make up the difference. TPD can only be paid for a maximum of 24 months.    Permanent Partial Disability (PPD): When your medical condition is stable and there is an indication of a PPD as a result of your injury or OD, within 30 days, your insurer must arrange for an evaluation by a rating physician or chiropractor to determine the degree of your PPD. The amount of your PPD award depends on the date of injury, the results of the PPD evaluation and your age and wage.    Permanent Total Disability (PTD): If you are medically certified by a treating physician or chiropractor as permanently and totally disabled and have been granted a PTD status by your insurer, you are entitled to receive monthly benefits not to exceed 66 2/3% of your average monthly wage. The amount of your PTD payments is subject to reduction if you previously received a PPD award.    Vocational Rehabilitation Services: You may be eligible for vocational rehabilitation services if you are unable to return to the job due to a permanent physical impairment or permanent restrictions as a result of your injury or occupational disease.    Transportation and Per Sarah Reimbursement: You may be eligible for travel expenses and per sarah associated with  medical treatment.  Reopening: You may be able to reopen your claim if your condition worsens after claim closure.    Appeal Process: If you disagree with a written determination issued by the insurer or the insurer does not respond to your request, you may appeal to the Department of Administration, , by following the instructions contained in your determination letter. You must appeal the determination within 70 days from the date of the determination letter at [address], [address], [place], [place] [address], or [address], [place], [place] [address]. If you disagree with the  decision, you may appeal to the Department of Administration, . You must file your appeal within 30 days from the date of the  decision letter at [address], [address], [place], [place] [address], or [address], [place], [place] [address]. If you disagree with a decision of an , you may file a petition for judicial review with the District Court. You must do so within 30 days of the Appeal Officer’s decision. You may be represented by an  at your own expense or you may contact the [facility] for possible representation.    [place]  for Injured Workers ([place]IW): If you disagree with a  decision, you may request that NAIW represent you without charge at an  Hearing. For information regarding denial of benefits, you may contact the [facility] at: [address], [address], [place] [address], [phone], or [address], [place] [address], [phone]    To File a Complaint with the Division: If you wish to file a complaint with the  of the Division of Industrial Relations (DIR), please contact the Workers’ Compensation Section, [address], [address], [place], [place] [address], telephone (890) [phone], or [address], [address], [place], [place] [address], telephone [phone].    For assistance with Workers’ Compensation Issues: you may contact the Office of the Governor Consumer Health Assistance, [address] [place] [address], [address], [place], [place] [address], Toll Free 1-288.696.5030, Web site: http://[place] - It's what's in store.[facility].[place]., E-mail [email or web address].[email or web address].                                                                                                                                                                               __________________________________________________________________                                    _________________            Employee Name / Signature                                                                                                                            Date                                       D-2 (rev. 10/07)

## 2019-04-26 NOTE — ED NOTES
Pt given written and oral discharge instructions. Pt verbalized understanding of all instructions given. All questions answered. VSS. Pt given f/u instructions and educated on s/s of when to return to the ER. Pt ambulating independently upon time of discharge in stable condition.

## 2020-12-20 ENCOUNTER — HOSPITAL ENCOUNTER (EMERGENCY)
Facility: MEDICAL CENTER | Age: 45
End: 2020-12-20
Attending: EMERGENCY MEDICINE | Admitting: EMERGENCY MEDICINE
Payer: COMMERCIAL

## 2020-12-20 VITALS
TEMPERATURE: 97.9 F | WEIGHT: 200.62 LBS | BODY MASS INDEX: 36.92 KG/M2 | HEIGHT: 62 IN | DIASTOLIC BLOOD PRESSURE: 64 MMHG | RESPIRATION RATE: 17 BRPM | OXYGEN SATURATION: 99 % | SYSTOLIC BLOOD PRESSURE: 116 MMHG | HEART RATE: 95 BPM

## 2020-12-20 DIAGNOSIS — G62.9 NEUROPATHY: ICD-10-CM

## 2020-12-20 LAB
ALBUMIN SERPL BCP-MCNC: 3.6 G/DL (ref 3.2–4.9)
ALBUMIN/GLOB SERPL: 1.2 G/DL
ALP SERPL-CCNC: 83 U/L (ref 30–99)
ALT SERPL-CCNC: 28 U/L (ref 2–50)
ANION GAP SERPL CALC-SCNC: 7 MMOL/L (ref 7–16)
AST SERPL-CCNC: 27 U/L (ref 12–45)
BASOPHILS # BLD AUTO: 0.5 % (ref 0–1.8)
BASOPHILS # BLD: 0.03 K/UL (ref 0–0.12)
BILIRUB SERPL-MCNC: 0.2 MG/DL (ref 0.1–1.5)
BUN SERPL-MCNC: 7 MG/DL (ref 8–22)
CALCIUM SERPL-MCNC: 9 MG/DL (ref 8.4–10.2)
CHLORIDE SERPL-SCNC: 104 MMOL/L (ref 96–112)
CO2 SERPL-SCNC: 28 MMOL/L (ref 20–33)
CREAT SERPL-MCNC: 0.62 MG/DL (ref 0.5–1.4)
EOSINOPHIL # BLD AUTO: 0.12 K/UL (ref 0–0.51)
EOSINOPHIL NFR BLD: 2.1 % (ref 0–6.9)
ERYTHROCYTE [DISTWIDTH] IN BLOOD BY AUTOMATED COUNT: 39.8 FL (ref 35.9–50)
GLOBULIN SER CALC-MCNC: 2.9 G/DL (ref 1.9–3.5)
GLUCOSE SERPL-MCNC: 99 MG/DL (ref 65–99)
HCT VFR BLD AUTO: 42.2 % (ref 37–47)
HGB BLD-MCNC: 13.7 G/DL (ref 12–16)
IMM GRANULOCYTES # BLD AUTO: 0.01 K/UL (ref 0–0.11)
IMM GRANULOCYTES NFR BLD AUTO: 0.2 % (ref 0–0.9)
LYMPHOCYTES # BLD AUTO: 1.35 K/UL (ref 1–4.8)
LYMPHOCYTES NFR BLD: 23.3 % (ref 22–41)
MAGNESIUM SERPL-MCNC: 1.9 MG/DL (ref 1.5–2.5)
MCH RBC QN AUTO: 26.7 PG (ref 27–33)
MCHC RBC AUTO-ENTMCNC: 32.5 G/DL (ref 33.6–35)
MCV RBC AUTO: 82.1 FL (ref 81.4–97.8)
MONOCYTES # BLD AUTO: 0.76 K/UL (ref 0–0.85)
MONOCYTES NFR BLD AUTO: 13.1 % (ref 0–13.4)
NEUTROPHILS # BLD AUTO: 3.52 K/UL (ref 2–7.15)
NEUTROPHILS NFR BLD: 60.8 % (ref 44–72)
NRBC # BLD AUTO: 0 K/UL
NRBC BLD-RTO: 0 /100 WBC
PLATELET # BLD AUTO: 190 K/UL (ref 164–446)
PMV BLD AUTO: 8.8 FL (ref 9–12.9)
POTASSIUM SERPL-SCNC: 3.8 MMOL/L (ref 3.6–5.5)
PROT SERPL-MCNC: 6.5 G/DL (ref 6–8.2)
RBC # BLD AUTO: 5.14 M/UL (ref 4.2–5.4)
SODIUM SERPL-SCNC: 139 MMOL/L (ref 135–145)
TSH SERPL DL<=0.005 MIU/L-ACNC: 1.05 UIU/ML (ref 0.38–5.33)
WBC # BLD AUTO: 5.8 K/UL (ref 4.8–10.8)

## 2020-12-20 PROCEDURE — 36415 COLL VENOUS BLD VENIPUNCTURE: CPT

## 2020-12-20 PROCEDURE — 84443 ASSAY THYROID STIM HORMONE: CPT

## 2020-12-20 PROCEDURE — 83735 ASSAY OF MAGNESIUM: CPT

## 2020-12-20 PROCEDURE — 80053 COMPREHEN METABOLIC PANEL: CPT

## 2020-12-20 PROCEDURE — 85025 COMPLETE CBC W/AUTO DIFF WBC: CPT

## 2020-12-20 PROCEDURE — 99283 EMERGENCY DEPT VISIT LOW MDM: CPT

## 2020-12-20 PROCEDURE — 82607 VITAMIN B-12: CPT

## 2020-12-20 RX ORDER — GABAPENTIN 100 MG/1
100 CAPSULE ORAL 3 TIMES DAILY
Qty: 30 CAP | Refills: 0 | Status: SHIPPED | OUTPATIENT
Start: 2020-12-20

## 2020-12-20 ASSESSMENT — LIFESTYLE VARIABLES: DO YOU DRINK ALCOHOL: NO

## 2020-12-20 NOTE — ED TRIAGE NOTES
"Presents with a C/O \"it hurts to be touched.\"  This symptomatology has been recurring since the beginning of the week.  She reports \" weird pain allover\" upon touch.  She does not appear to be in any acute distress.  Chief Complaint   Patient presents with   • Other     /67   Pulse 99   Temp 36.6 °C (97.8 °F) (Temporal)   Resp 18   Ht 1.575 m (5' 2\")   Wt 91 kg (200 lb 9.9 oz)   SpO2 98%   BMI 36.69 kg/m²      "

## 2020-12-21 LAB — VIT B12 SERPL-MCNC: 3971 PG/ML (ref 211–911)

## 2020-12-21 ASSESSMENT — ENCOUNTER SYMPTOMS
SHORTNESS OF BREATH: 0
CHILLS: 0
FEVER: 0
FOCAL WEAKNESS: 0
EYE REDNESS: 0
NECK PAIN: 0
SORE THROAT: 0
ABDOMINAL PAIN: 0
HEADACHES: 0
COUGH: 0
VOMITING: 0
BLURRED VISION: 0
SEIZURES: 0
BACK PAIN: 0

## 2020-12-21 NOTE — ED PROVIDER NOTES
"ED Provider Note    CHIEF COMPLAINT  Chief Complaint   Patient presents with   • Other       HPI  Tiffany Gottlieb is a 45 y.o. female with past medical history of chronic back pain, fibromyalgia, carcinoid tumor of the lung who presents with hyperalgesia and \"pain all over her body\".  She states that symptoms have been ongoing for the last week.  She describes a increased sensitivity of her skin all over her body as well as a sharp burning pain.  She denies any associated rash.  She states she has felt hard lumps in her breasts and abdominal wall.  She feels as when she presses in her skin there is signs of fluid retention.  No recent fevers or infectious symptoms.  Patient has a history of chronic back pain for which she takes morphine and oxycodone.  She states she has been taking these regularly and is not out of her medications.  She denies history of alcohol or drug use.    REVIEW OF SYSTEMS  See HPI for further details.   Review of Systems   Constitutional: Negative for chills and fever.   HENT: Negative for sore throat.    Eyes: Negative for blurred vision and redness.   Respiratory: Negative for cough and shortness of breath.    Cardiovascular: Negative for chest pain and leg swelling.   Gastrointestinal: Negative for abdominal pain and vomiting.   Genitourinary: Negative for dysuria and urgency.   Musculoskeletal: Negative for back pain and neck pain.   Skin: Negative for rash.        Diffuse pain all over body   Neurological: Negative for focal weakness, seizures and headaches.   Psychiatric/Behavioral: Negative for suicidal ideas.         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    SOCIAL HISTORY  Social History " "    Tobacco Use   • Smoking status: Passive Smoke Exposure - Never Smoker   • Smokeless tobacco: Never Used   Substance and Sexual Activity   • Alcohol use: No   • Drug use: No   • Sexual activity: Not on file       SURGICAL HISTORY   has a past surgical history that includes gastric bypass laparoscopic; cholecystectomy; exploratory laparotomy (4/5/2011); gastroscopy (4/17/2011); gastroscopy (4/28/2011); gastrostomy laparoscopic (5/16/2011); primary c section; hysterectomy laparoscopy; thoracotomy (Right, 11/14/2016); lobectomy (11/14/2016); and bronchoscopy (11/14/2016).    CURRENT MEDICATIONS  Home Medications    **Home medications have not yet been reviewed for this encounter**         ALLERGIES  Allergies   Allergen Reactions   • Contrast Media With Iodine [Iodine] Itching     After CT with IV and oral contrast on 4/15/14   • Hydrocodone Itching     Rxn - 2011  Hands itch       PHYSICAL EXAM   VITAL SIGNS: /64   Pulse 95   Temp 36.6 °C (97.9 °F) (Temporal)   Resp 17   Ht 1.575 m (5' 2\")   Wt 91 kg (200 lb 9.9 oz)   SpO2 99%   BMI 36.69 kg/m²      Physical Exam   Constitutional: She is oriented to person, place, and time and well-developed, well-nourished, and in no distress. No distress.   Well-appearing female, anxious   HENT:   Head: Normocephalic and atraumatic.   Eyes: Pupils are equal, round, and reactive to light. Conjunctivae are normal.   Neck: Normal range of motion. Neck supple.   Cardiovascular: Normal rate, regular rhythm and normal heart sounds.   Pulmonary/Chest: Effort normal and breath sounds normal. No respiratory distress.   Breast exam without abnormal masses or lesions   Abdominal: Soft. She exhibits no distension. There is no abdominal tenderness.   Musculoskeletal: Normal range of motion.         General: No tenderness or edema.   Neurological: She is alert and oriented to person, place, and time.   Moving all extremities spontaneously   Skin: Skin is warm and dry. " "  Psychiatric: Affect normal.         DIAGNOSTIC STUDIES      LABS  Personally reviewed by me  Labs Reviewed   CBC WITH DIFFERENTIAL - Abnormal; Notable for the following components:       Result Value    MCH 26.7 (*)     MCHC 32.5 (*)     MPV 8.8 (*)     All other components within normal limits   COMP METABOLIC PANEL - Abnormal; Notable for the following components:    Bun 7 (*)     All other components within normal limits   MAGNESIUM   TSH   VITAMIN B12   ESTIMATED GFR           ED COURSE  Vitals:    12/20/20 1543 12/20/20 1546 12/20/20 1848   BP:  118/67 116/64   Pulse:  99 95   Resp:  18 17   Temp:  36.6 °C (97.8 °F) 36.6 °C (97.9 °F)   TempSrc:  Temporal Temporal   SpO2:  98% 99%   Weight: 91 kg (200 lb 9.9 oz)     Height: 1.575 m (5' 2\")           Medications administered:  Medications - No data to display        MEDICAL DECISION MAKING  Patient presents with 1 week history of what she describes as \"pain all over her body\", also described as a sharp burning sensation to her skin.  She is well-appearing with normal vital signs on arrival.  She does appear anxious with some pressured speech.  I see no evidence of skin changes or rash.  Patient has no focal neurologic findings concerning for stroke or intracranial or spinal pathology.  She is worried about lumps in her breasts and abdominal wall which I cannot appreciate.  Her labs are reassuring with normal blood counts and electrolytes.  Thyroid test was normal.  Vitamin B12 was sent and pending at the time of discharge.  She possibly has some type of neuropathy or flare of her fibromyalgia.  She is on chronic opiates however does not report any decreased use to suggest opiate withdrawal.  Possibly suspicious for other drug use.  We will plan to discharge the patient home with gabapentin and outpatient follow-up.  I have left a message for the  to attempt to help with finding her primary care provider.  Patient understands plan of care and strict " return precautions for changing or worsening symptoms.          IMPRESSION  (G62.9) Neuropathy    Disposition: Discharge home, stable condition  Results, diagnoses, and treatment options were discussed with the patient and/or family. Patient verbalized understanding of plan of care.    Patient referred to primary care provider for monitoring and treatment of blood pressure.      Discharge Medication List as of 12/20/2020  6:43 PM      START taking these medications    Details   gabapentin (NEURONTIN) 100 MG Cap Take 1 Cap by mouth 3 times a day., Disp-30 Cap, R-0, Print Rx Paper                 Electronically signed by: Deborah Ralph M.D., 12/20/2020 6:32 PM

## 2021-06-29 PROBLEM — M51.369 LUMBAR DEGENERATIVE DISC DISEASE: Status: ACTIVE | Noted: 2021-06-29

## 2021-06-29 PROBLEM — M51.36 LUMBAR DEGENERATIVE DISC DISEASE: Status: ACTIVE | Noted: 2021-06-29

## 2021-06-29 PROBLEM — M79.10 MYALGIA: Status: ACTIVE | Noted: 2021-06-29

## 2021-06-29 PROBLEM — M70.62 GREATER TROCHANTERIC BURSITIS OF LEFT HIP: Status: ACTIVE | Noted: 2021-06-29

## 2021-06-29 PROBLEM — M51.36 DDD (DEGENERATIVE DISC DISEASE), LUMBAR: Status: ACTIVE | Noted: 2021-06-29

## 2021-06-29 PROBLEM — M51.369 DDD (DEGENERATIVE DISC DISEASE), LUMBAR: Status: ACTIVE | Noted: 2021-06-29

## 2021-06-29 PROBLEM — M70.61 GREATER TROCHANTERIC BURSITIS OF RIGHT HIP: Status: ACTIVE | Noted: 2021-06-29

## 2021-06-29 PROBLEM — M54.12 CERVICAL RADICULITIS: Status: ACTIVE | Noted: 2021-06-29

## 2023-06-13 ENCOUNTER — OFFICE VISIT (OUTPATIENT)
Dept: URGENT CARE | Facility: PHYSICIAN GROUP | Age: 48
End: 2023-06-13
Payer: COMMERCIAL

## 2023-06-13 VITALS
RESPIRATION RATE: 16 BRPM | TEMPERATURE: 97.9 F | HEART RATE: 99 BPM | DIASTOLIC BLOOD PRESSURE: 80 MMHG | HEIGHT: 62 IN | BODY MASS INDEX: 36.07 KG/M2 | SYSTOLIC BLOOD PRESSURE: 112 MMHG | WEIGHT: 196 LBS | OXYGEN SATURATION: 96 %

## 2023-06-13 DIAGNOSIS — R45.86 EMOTIONAL LABILITY: ICD-10-CM

## 2023-06-13 DIAGNOSIS — R23.2 HOT FLASHES: ICD-10-CM

## 2023-06-13 DIAGNOSIS — F41.9 ANXIETY: ICD-10-CM

## 2023-06-13 DIAGNOSIS — N95.1 PERIMENOPAUSE: ICD-10-CM

## 2023-06-13 PROCEDURE — 3079F DIAST BP 80-89 MM HG: CPT | Performed by: PHYSICIAN ASSISTANT

## 2023-06-13 PROCEDURE — 99204 OFFICE O/P NEW MOD 45 MIN: CPT | Performed by: PHYSICIAN ASSISTANT

## 2023-06-13 PROCEDURE — 3074F SYST BP LT 130 MM HG: CPT | Performed by: PHYSICIAN ASSISTANT

## 2023-06-13 RX ORDER — MORPHINE SULFATE 30 MG/1
TABLET, FILM COATED, EXTENDED RELEASE ORAL
COMMUNITY
Start: 2023-06-12

## 2023-06-13 RX ORDER — OXYCODONE HYDROCHLORIDE 10 MG/1
TABLET ORAL
COMMUNITY
Start: 2023-04-29

## 2023-06-13 RX ORDER — ESCITALOPRAM OXALATE 5 MG/1
5 TABLET ORAL DAILY
Qty: 30 TABLET | Refills: 0 | Status: SHIPPED | OUTPATIENT
Start: 2023-06-13

## 2023-06-13 RX ORDER — OXYCODONE AND ACETAMINOPHEN 10; 325 MG/1; MG/1
TABLET ORAL
COMMUNITY
Start: 2023-05-26 | End: 2023-07-17

## 2023-06-13 ASSESSMENT — ENCOUNTER SYMPTOMS: FLANK PAIN: 0

## 2023-06-14 NOTE — PROGRESS NOTES
Subjective:   Tiffany Gottlieb is a 47 y.o. female who presents for Menopause (Night sweats, hot flashes, anxiety, mood swings, pt believes they are starting menopause, pt has pcp appt next month. )        Menopause  The patient's primary symptoms include missed menses (has not had period in 25 years due to hysterectomy). The patient's pertinent negatives include no genital itching, genital lesions, genital odor, genital rash, pelvic pain, vaginal bleeding or vaginal discharge. Primary symptoms comment: Hot flashes, moodiness, anxiety, difficulty sleeping.. This is a new problem. Episode onset: 2.5 months. The problem occurs constantly. The problem has been waxing and waning. She is not pregnant (History of hysterectomy 25 years ago.). Pertinent negatives include no flank pain, frequency or painful intercourse. The vaginal discharge was normal. There has been no bleeding. She has tried nothing for the symptoms. Her past medical history is significant for a gynecological surgery.     Review of Systems   Genitourinary:  Positive for missed menses (has not had period in 25 years due to hysterectomy). Negative for flank pain, frequency, pelvic pain and vaginal discharge.       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  MEDS:   Current Outpatient Medications:     morphine ER (MS CONTIN) 30 MG Tab CR tablet, TAKE 1 TABLET BY MOUTH THREE TIMES DAILY AS NEEDED FOR PAIN FOR 30 DAYS, Disp: , Rfl:     oxyCODONE immediate release (ROXICODONE) 10 MG immediate release tablet, TAKE 1 TAB BY MOUTH EVERY 4-6 HOURS AS NEEDED FOR PAIN, M51, Disp: , Rfl:     oxyCODONE-acetaminophen (PERCOCET-10)  MG Tab, TAKE 1 TAB BY MOUTH EVERY 4-6 HOURS AS NEEDED FOR PAIN, MAX 6  A DAY, M51.36, Disp: , Rfl:     escitalopram (LEXAPRO) 5 MG tablet, Take 1 Tablet by mouth every day., Disp: 30 Tablet, Rfl: 0    gabapentin (NEURONTIN) 100 MG Cap, Take 1 Cap by mouth 3 times a day., Disp: 30 Cap, Rfl: 0    asa/apap/caffeine (EXCEDRIN) 250-250-65 MG Tab, Take 1 Tab by mouth every 6 hours as needed for Headache., Disp: , Rfl:     tizanidine (ZANAFLEX) 4 MG Tab, Take 8 mg by mouth at bedtime., Disp: , Rfl:     Multiple Vitamin (MULTI VITAMIN PO), Take 1 Tab by mouth every day., Disp: , Rfl:   ALLERGIES:   Allergies   Allergen Reactions    Contrast Media With Iodine [Iodine] Itching     After CT with IV and oral contrast on 4/15/14    Hydrocodone Itching     Rxn - 2011  Hands itch     SURGHX:   Past Surgical History:   Procedure Laterality Date    THORACOTOMY Right 11/14/2016    Procedure: THORACOTOMY;  Surgeon: John H Ganser, M.D.;  Location: [facility];  Service:     LOBECTOMY  11/14/2016    Procedure: LOBECTOMY middle   ;  Surgeon: John H Ganser, M.D.;  Location: [facility];  Service:     BRONCHOSCOPY  11/14/2016    Procedure: BRONCHOSCOPY  ;  Surgeon: John H Ganser, M.D.;  Location: [facility];  Service:     GASTROSTOMY LAPAROSCOPIC  5/16/2011    Performed by TITI SELLERS at [facility]    GASTROSCOPY  4/28/2011    Performed by BEAN ANDRES at [facility]    GASTROSCOPY  4/17/2011    Performed by RASHI HOFFMAN at [facility]    EXPLORATORY LAPAROTOMY  4/5/2011    Performed by BEAN YOU at [facility]    CHOLECYSTECTOMY      GASTRIC BYPASS LAPAROSCOPIC      2004    HYSTERECTOMY LAPAROSCOPY      partial, removed utereus, still has BSO    PRIMARY C SECTION       SOCHX:  reports that she is a non-smoker but has been exposed to tobacco smoke. She has been exposed to an average 2.00 packs per day for the past 14.00 years. She has never used smokeless tobacco. She reports that she does not drink alcohol  "and does not use drugs.  FH: Family history was reviewed, no pertinent findings to report   Objective:   /80 (BP Location: Right arm, Patient Position: Sitting, BP Cuff Size: Adult)   Pulse 99   Temp 36.6 °C (97.9 °F) (Temporal)   Resp 16   Ht 1.575 m (5' 2\")   Wt 88.9 kg (196 lb)   SpO2 96%   BMI 35.85 kg/m²   Physical Exam  Vitals reviewed.   Constitutional:       General: She is not in acute distress.     Appearance: Normal appearance. She is well-developed. She is not toxic-appearing.   HENT:      Head: Normocephalic and atraumatic.      Right Ear: External ear normal.      Left Ear: External ear normal.      Nose: Nose normal.   Cardiovascular:      Rate and Rhythm: Normal rate and regular rhythm.      Heart sounds: Normal heart sounds, S1 normal and S2 normal.   Pulmonary:      Effort: Pulmonary effort is normal. No respiratory distress.      Breath sounds: Normal breath sounds. No stridor. No decreased breath sounds, wheezing, rhonchi or rales.   Skin:     General: Skin is dry.   Neurological:      Comments: Alert and oriented.    Psychiatric:         Speech: Speech normal.         Behavior: Behavior normal.           Assessment/Plan:   1. Perimenopause  - escitalopram (LEXAPRO) 5 MG tablet; Take 1 Tablet by mouth every day.  Dispense: 30 Tablet; Refill: 0    2. Hot flashes  - escitalopram (LEXAPRO) 5 MG tablet; Take 1 Tablet by mouth every day.  Dispense: 30 Tablet; Refill: 0    3. Emotional lability  - escitalopram (LEXAPRO) 5 MG tablet; Take 1 Tablet by mouth every day.  Dispense: 30 Tablet; Refill: 0    4. Anxiety    Other orders  - morphine ER (MS CONTIN) 30 MG Tab CR tablet; TAKE 1 TABLET BY MOUTH THREE TIMES DAILY AS NEEDED FOR PAIN FOR 30 DAYS  - oxyCODONE immediate release (ROXICODONE) 10 MG immediate release tablet; TAKE 1 TAB BY MOUTH EVERY 4-6 HOURS AS NEEDED FOR PAIN, M51  - oxyCODONE-acetaminophen (PERCOCET-10)  MG Tab; TAKE 1 TAB BY MOUTH EVERY 4-6 HOURS AS NEEDED FOR PAIN, " MAX 6 A DAY, M51.36    Patient meets clinical criteria for perimenopause.  Patient is having difficulty managing her symptoms.  Patient has risk factors for hormone therapy and I do not feel it is safe or appropriate to initiate hormone therapy in this practice setting.    We will start patient on Lexapro to help manage symptoms.  Medication side effects reviewed.  Follow-up with PCP on July 11 for further evaluation and management.

## 2023-07-17 ENCOUNTER — OFFICE VISIT (OUTPATIENT)
Dept: MEDICAL GROUP | Facility: PHYSICIAN GROUP | Age: 48
End: 2023-07-17
Payer: COMMERCIAL

## 2023-07-17 VITALS
OXYGEN SATURATION: 98 % | BODY MASS INDEX: 36.33 KG/M2 | HEIGHT: 62 IN | HEART RATE: 76 BPM | WEIGHT: 197.4 LBS | DIASTOLIC BLOOD PRESSURE: 76 MMHG | TEMPERATURE: 98.3 F | SYSTOLIC BLOOD PRESSURE: 122 MMHG

## 2023-07-17 DIAGNOSIS — N39.3 STRESS INCONTINENCE: ICD-10-CM

## 2023-07-17 DIAGNOSIS — F41.9 ANXIETY: ICD-10-CM

## 2023-07-17 DIAGNOSIS — Z12.11 ENCOUNTER FOR SCREENING FOR MALIGNANT NEOPLASM OF COLON: ICD-10-CM

## 2023-07-17 DIAGNOSIS — Z00.00 ENCOUNTER FOR MEDICAL EXAMINATION TO ESTABLISH CARE: ICD-10-CM

## 2023-07-17 DIAGNOSIS — Z23 NEED FOR VACCINATION: ICD-10-CM

## 2023-07-17 DIAGNOSIS — D50.9 IRON DEFICIENCY ANEMIA, UNSPECIFIED IRON DEFICIENCY ANEMIA TYPE: ICD-10-CM

## 2023-07-17 DIAGNOSIS — Z11.59 ENCOUNTER FOR HEPATITIS C SCREENING TEST FOR LOW RISK PATIENT: ICD-10-CM

## 2023-07-17 DIAGNOSIS — N95.1 VASOMOTOR SYMPTOMS DUE TO MENOPAUSE: ICD-10-CM

## 2023-07-17 PROBLEM — A41.9 SEPSIS (HCC): Status: RESOLVED | Noted: 2017-08-21 | Resolved: 2023-07-17

## 2023-07-17 PROBLEM — N12 PYELONEPHRITIS: Status: RESOLVED | Noted: 2017-08-21 | Resolved: 2023-07-17

## 2023-07-17 PROBLEM — M79.10 MYALGIA: Status: RESOLVED | Noted: 2021-06-29 | Resolved: 2023-07-17

## 2023-07-17 PROCEDURE — 90472 IMMUNIZATION ADMIN EACH ADD: CPT

## 2023-07-17 PROCEDURE — 3078F DIAST BP <80 MM HG: CPT

## 2023-07-17 PROCEDURE — 99214 OFFICE O/P EST MOD 30 MIN: CPT | Mod: 25

## 2023-07-17 PROCEDURE — 90746 HEPB VACCINE 3 DOSE ADULT IM: CPT

## 2023-07-17 PROCEDURE — 3074F SYST BP LT 130 MM HG: CPT

## 2023-07-17 PROCEDURE — 90715 TDAP VACCINE 7 YRS/> IM: CPT

## 2023-07-17 PROCEDURE — 90471 IMMUNIZATION ADMIN: CPT

## 2023-07-17 RX ORDER — PROPRANOLOL HYDROCHLORIDE 10 MG/1
10 TABLET ORAL 3 TIMES DAILY PRN
Qty: 100 TABLET | Refills: 0 | Status: SHIPPED | OUTPATIENT
Start: 2023-07-17

## 2023-07-17 ASSESSMENT — PATIENT HEALTH QUESTIONNAIRE - PHQ9
5. POOR APPETITE OR OVEREATING: 0 - NOT AT ALL
SUM OF ALL RESPONSES TO PHQ QUESTIONS 1-9: 10
CLINICAL INTERPRETATION OF PHQ2 SCORE: 4

## 2023-07-17 NOTE — PROGRESS NOTES
"Subjective:     CC:    Chief Complaint   Patient presents with    Establish Care     Lab orders    Menopause     Possibly starting     Bladder Problem     Weak bladder     Anxiety     HISTORY OF THE PRESENT ILLNESS: Tiffany is a pleasant 47 y.o. female here today to establish care and discuss the conditions below. She has not seen a PCP for approximately 10 years.  She is followed by a pain specialist for chronic pain issues.     Problem   Vasomotor Symptoms Due to Menopause    She is having night sweats. She also has issues sleeping.  She typically sleeps 3-4 hours but now that she has night sweats, she is not sleeping at all.  She had a partial hysterectomy so is not sure if she is going into menopause.  She recently started a vitamin for menopause symptoms and it has helped a lot.         Stress Incontinence    She has struggled for a while with stress incontinence.   She now over the last year, symptoms have worsened and she now has urge incontinence.   She has started doing pelvic floor exercises and does Kiegles frequently.     Anxiety    She has anxiety about 1-2 times a week.  She really doesn't want to take anything that will make her groggy.  She isn't interested in an SSRI because she has heard it can cause diarrhea.        Health Maintenance: Completed    ROS:   All systems negative except as addressed in assessment and plan.     Objective:     Exam: /76 (BP Location: Left arm, Patient Position: Sitting, BP Cuff Size: Adult)   Pulse 76   Temp 36.8 °C (98.3 °F) (Temporal)   Ht 1.575 m (5' 2\")   Wt 89.5 kg (197 lb 6.4 oz)   SpO2 98%  Body mass index is 36.1 kg/m².    Physical Exam  Vitals reviewed.   Constitutional:       Appearance: Normal appearance.   Cardiovascular:      Rate and Rhythm: Normal rate.   Pulmonary:      Effort: Pulmonary effort is normal.   Musculoskeletal:         General: Normal range of motion.   Skin:     General: Skin is warm and dry.   Neurological:      Mental Status: " Mental status is at baseline.   Psychiatric:         Mood and Affect: Mood normal.         Behavior: Behavior normal.       Labs: No recent labs on file to review      Assessment & Plan:   47 y.o. female with the following -    1. Encounter for medical examination to establish care  Health conditions and medications reviewed and updated. All screenings discussed and up-to-date. Health maintenance completed.     - TSH WITH REFLEX TO FT4; Future  - VITAMIN D,25 HYDROXY (DEFICIENCY); Future  - Lipid Profile; Future  - HEMOGLOBIN A1C; Future  - Comp Metabolic Panel; Future  - CBC WITH DIFFERENTIAL; Future    2. Vasomotor symptoms due to menopause  - FSH/LH; Future  - DHEA SULFATE; Future  - TESTOSTERONE, FREE AND TOTAL; Future  - PROLACTIN; Future  - ANTI-MULLERIAN HORMONE([facility]); Future  - ESTRADIOL; Future    3. Stress incontinence    4. Anxiety  Chronic, ongoing.  Patient is not interested in a daily medication, such as an SSRI.  After discussion of options, we will try propranolol.  - propranolol (INDERAL) 10 MG Tab; Take 1 Tablet by mouth 3 times a day as needed (Anxiety).  Dispense: 100 Tablet; Refill: 0    5. Iron deficiency anemia, unspecified iron deficiency anemia type  - CBC WITH DIFFERENTIAL; Future    6. Encounter for screening for malignant neoplasm of colon  - Referral to GI for Colonoscopy  - COLOGUARD (FIT DNA)    7. Encounter for hepatitis C screening test for low risk patient  - HEP C VIRUS ANTIBODY; Future    Patient was educated in proper administration of medication(s) ordered today including safety, possible SE, risks, benefits, rationale and alternatives to therapy.   Supportive care, differential diagnoses, and indications for immediate follow-up discussed with patient.    Pathogenesis of diagnosis discussed including typical length and natural progression.    Instructed to return to clinic or nearest emergency department for any change in condition, further concerns, or worsening of  symptoms.  Patient states understanding of the plan of care and discharge instructions.    Return for Wellness Visit.    I spent a total of 37 minutes with record review, exam, and communication with the patient, communication with other providers, and documentation of this encounter. This does not include time spent on separately billable procedures/tests.    I have placed the above orders and discussed them with an approved delegating provider.  The MA is performing the below orders under the direction of Dr. Hercules.    Please note that this dictation was created using voice recognition software. I have worked with consultants from the vendor as well as technical experts from [facility] to optimize the interface. I have made every reasonable attempt to correct obvious errors, but I expect that there are errors of grammar and possibly content that I did not discover before finalizing the note.

## 2023-09-19 ENCOUNTER — DOCUMENTATION (OUTPATIENT)
Dept: HEALTH INFORMATION MANAGEMENT | Facility: OTHER | Age: 48
End: 2023-09-19
Payer: COMMERCIAL

## 2024-02-05 ENCOUNTER — TELEPHONE (OUTPATIENT)
Dept: HEALTH INFORMATION MANAGEMENT | Facility: OTHER | Age: 49
End: 2024-02-05
Payer: COMMERCIAL

## 2024-06-28 ENCOUNTER — HOSPITAL ENCOUNTER (OUTPATIENT)
Dept: RADIOLOGY | Facility: MEDICAL CENTER | Age: 49
End: 2024-06-28
Attending: PHYSICAL MEDICINE & REHABILITATION
Payer: COMMERCIAL

## 2024-06-28 DIAGNOSIS — M50.30 DEGENERATION OF CERVICAL INTERVERTEBRAL DISC: ICD-10-CM

## 2024-06-28 DIAGNOSIS — M54.12 BRACHIAL NEURITIS: ICD-10-CM

## 2024-09-05 ENCOUNTER — HOSPITAL ENCOUNTER (EMERGENCY)
Facility: MEDICAL CENTER | Age: 49
End: 2024-09-05
Attending: STUDENT IN AN ORGANIZED HEALTH CARE EDUCATION/TRAINING PROGRAM
Payer: COMMERCIAL

## 2024-09-05 VITALS
RESPIRATION RATE: 19 BRPM | OXYGEN SATURATION: 95 % | WEIGHT: 217.59 LBS | DIASTOLIC BLOOD PRESSURE: 79 MMHG | HEART RATE: 89 BPM | HEIGHT: 62 IN | BODY MASS INDEX: 40.04 KG/M2 | TEMPERATURE: 97.6 F | SYSTOLIC BLOOD PRESSURE: 114 MMHG

## 2024-09-05 DIAGNOSIS — R60.0 PERIPHERAL EDEMA: ICD-10-CM

## 2024-09-05 LAB
ALBUMIN SERPL BCP-MCNC: 3.5 G/DL (ref 3.2–4.9)
ALBUMIN/GLOB SERPL: 1.3 G/DL
ALP SERPL-CCNC: 89 U/L (ref 30–99)
ALT SERPL-CCNC: 13 U/L (ref 2–50)
ANION GAP SERPL CALC-SCNC: 13 MMOL/L (ref 7–16)
AST SERPL-CCNC: 19 U/L (ref 12–45)
BASOPHILS # BLD AUTO: 0.4 % (ref 0–1.8)
BASOPHILS # BLD: 0.02 K/UL (ref 0–0.12)
BILIRUB SERPL-MCNC: 0.2 MG/DL (ref 0.1–1.5)
BUN SERPL-MCNC: 13 MG/DL (ref 8–22)
CALCIUM ALBUM COR SERPL-MCNC: 8.9 MG/DL (ref 8.5–10.5)
CALCIUM SERPL-MCNC: 8.5 MG/DL (ref 8.4–10.2)
CHLORIDE SERPL-SCNC: 107 MMOL/L (ref 96–112)
CO2 SERPL-SCNC: 22 MMOL/L (ref 20–33)
CREAT SERPL-MCNC: 0.62 MG/DL (ref 0.5–1.4)
EOSINOPHIL # BLD AUTO: 0.25 K/UL (ref 0–0.51)
EOSINOPHIL NFR BLD: 5.6 % (ref 0–6.9)
ERYTHROCYTE [DISTWIDTH] IN BLOOD BY AUTOMATED COUNT: 38.7 FL (ref 35.9–50)
GFR SERPLBLD CREATININE-BSD FMLA CKD-EPI: 109 ML/MIN/1.73 M 2
GLOBULIN SER CALC-MCNC: 2.6 G/DL (ref 1.9–3.5)
GLUCOSE SERPL-MCNC: 83 MG/DL (ref 65–99)
HCT VFR BLD AUTO: 39.3 % (ref 37–47)
HGB BLD-MCNC: 12.6 G/DL (ref 12–16)
IMM GRANULOCYTES # BLD AUTO: 0.01 K/UL (ref 0–0.11)
IMM GRANULOCYTES NFR BLD AUTO: 0.2 % (ref 0–0.9)
LYMPHOCYTES # BLD AUTO: 1.33 K/UL (ref 1–4.8)
LYMPHOCYTES NFR BLD: 29.8 % (ref 22–41)
MCH RBC QN AUTO: 26.5 PG (ref 27–33)
MCHC RBC AUTO-ENTMCNC: 32.1 G/DL (ref 32.2–35.5)
MCV RBC AUTO: 82.7 FL (ref 81.4–97.8)
MONOCYTES # BLD AUTO: 0.33 K/UL (ref 0–0.85)
MONOCYTES NFR BLD AUTO: 7.4 % (ref 0–13.4)
NEUTROPHILS # BLD AUTO: 2.52 K/UL (ref 1.82–7.42)
NEUTROPHILS NFR BLD: 56.6 % (ref 44–72)
NRBC # BLD AUTO: 0 K/UL
NRBC BLD-RTO: 0 /100 WBC (ref 0–0.2)
NT-PROBNP SERPL IA-MCNC: 136 PG/ML (ref 0–125)
PLATELET # BLD AUTO: 191 K/UL (ref 164–446)
PMV BLD AUTO: 9.1 FL (ref 9–12.9)
POTASSIUM SERPL-SCNC: 4 MMOL/L (ref 3.6–5.5)
PROT SERPL-MCNC: 6.1 G/DL (ref 6–8.2)
RBC # BLD AUTO: 4.75 M/UL (ref 4.2–5.4)
SODIUM SERPL-SCNC: 142 MMOL/L (ref 135–145)
WBC # BLD AUTO: 4.5 K/UL (ref 4.8–10.8)

## 2024-09-05 PROCEDURE — 85025 COMPLETE CBC W/AUTO DIFF WBC: CPT

## 2024-09-05 PROCEDURE — 36415 COLL VENOUS BLD VENIPUNCTURE: CPT

## 2024-09-05 PROCEDURE — 80053 COMPREHEN METABOLIC PANEL: CPT

## 2024-09-05 PROCEDURE — 99283 EMERGENCY DEPT VISIT LOW MDM: CPT

## 2024-09-05 PROCEDURE — 83880 ASSAY OF NATRIURETIC PEPTIDE: CPT

## 2024-09-06 NOTE — ED NOTES
Pt. Verbalizes understanding of discharge instructions. accompanied to lobby with spouse. Pt. Alert/awake in NAD.  All questions answered and understood. Gave some ace wraps she will be the one to put it when she reach home. Advised to ff-up with PCP.

## 2024-09-06 NOTE — ED PROVIDER NOTES
CHIEF COMPLAINT  Chief Complaint   Patient presents with    Leg Swelling     Bilat legs x 1 week. Denies CP, SOB, pain in legs.        LIMITATION TO HISTORY   Select: none    HPI    Tiffany Gottlieb is a 48 y.o. female who presents to the Emergency Department presents for evaluation of bilateral lower extremity which has have been ongoing for the past week.  Denies any associated chest pain orthopnea shortness of breath.  Denies any known history of CHF or renal dysfunction liver dysfunction.  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    Chronic low back pain     Cough 9/14/2016    Coughing blood     10/2016    Degenerative disc disease     lumbar    Degenerative joint disease 9/14/2016    Dental disorder     missing teeth top and bottom, 4 molars that are broken in the back    Fibromyalgia     Hemoptysis 9/9/2016    History of gastric ulcer     ruptured gastric ulcer, punctured stomach while inpatient, 3 month stay    Hypotension 12/8/2016    Intra-abdominal abscess (HCC) 4/16/2011    Lung collapse 5/10/2011    IMO load March 2020    Myalgia     Psychiatric problem     anxiety and depression    Pulmonary embolism (HCC) 2011    no blood thinners anymore    Pulmonary infiltrate on chest x-ray 5/10/2011    ICD-10 transition    Pyelonephritis 8/21/2017    SBO (small bowel obstruction) (HCC) 6/19/2014    Seizure (HCC)     grand mal- last one 3/2015    Sepsis(995.91) 8/21/2017    Severe protein-calorie  malnutrition (HCC) 4/16/2011     .    SURGICAL HISTORY  Past Surgical History:   Procedure Laterality Date    THORACOTOMY Right 11/14/2016    Procedure: THORACOTOMY;  Surgeon: John H Ganser, M.D.;  Location: SURGERY [facility];  Service:     LOBECTOMY  11/14/2016    Procedure: LOBECTOMY middle   ;  Surgeon: John H Ganser, M.D.;  Location: SURGERY [facility];  Service:     BRONCHOSCOPY  11/14/2016    Procedure: BRONCHOSCOPY  ;  Surgeon: John H Ganser, M.D.;  Location: SURGERY [facility];  Service:     GASTROSTOMY LAPAROSCOPIC  5/16/2011    Performed by TITI SELLERS at SURGERY [facility]    GASTROSCOPY  4/28/2011    Performed by BEAN ANDRES at SURGERY [facility] ORS    GASTROSCOPY  4/17/2011    Performed by RASHI HOFFMAN at SURGERY [facility]    EXPLORATORY LAPAROTOMY  4/5/2011    Performed by BEAN YOU at SURGERY [facility]    CHOLECYSTECTOMY      GASTRIC BYPASS LAPAROSCOPIC      2004    HYSTERECTOMY LAPAROSCOPY      partial, removed utereus, still has BSO    PRIMARY C SECTION           FAMILY HISTORY  No family history on file.       SOCIAL HISTORY  Social History     Socioeconomic History    Marital status:      Spouse name: Not on file    Number of children: Not on file    Years of education: Not on file    Highest education level: Some college, no degree   Occupational History    Not on file   Tobacco Use    Smoking status: Never     Passive exposure: Yes    Smokeless tobacco: Never   Substance and Sexual Activity    Alcohol use: Yes     Comment: occationally    Drug use: No    Sexual activity: Not on file   Other Topics Concern    Not on file   Social History Narrative    Not on file     Social Determinants of Health     Financial Resource Strain: Medium Risk (7/8/2023)    Overall Financial Resource Strain (CARDIA)     Difficulty of Paying Living Expenses: Somewhat hard   Food Insecurity: Food Insecurity Present (7/8/2023)    Hunger Vital Sign     Worried  About Running Out of Food in the Last Year: Sometimes true     Ran Out of Food in the Last Year: Sometimes true   Transportation Needs: No Transportation Needs (7/8/2023)    PRAPARE - Transportation     Lack of Transportation (Medical): No     Lack of Transportation (Non-Medical): No   Physical Activity: Insufficiently Active (7/8/2023)    Exercise Vital Sign     Days of Exercise per Week: 1 day     Minutes of Exercise per Session: 20 min   Stress: Stress Concern Present (7/8/2023)    [employer] - Occupational Stress Questionnaire     Feeling of Stress : Very much   Social Connections: Socially Isolated (7/8/2023)    Social Connection and Isolation Panel [NHANES]     Frequency of Communication with Friends and Family: Never     Frequency of Social Gatherings with Friends and Family: Never     Attends Sabianism Services: Never     Active Member of Clubs or Organizations: No     Attends Club or Organization Meetings: Never     Marital Status:    Intimate Partner Violence: Not on file   Housing Stability: High Risk (7/8/2023)    Housing Stability Vital Sign     Unable to Pay for Housing in the Last Year: Yes     Number of Places Lived in the Last Year: 1     Unstable Housing in the Last Year: No         CURRENT MEDICATIONS  No current facility-administered medications on file prior to encounter.     Current Outpatient Medications on File Prior to Encounter   Medication Sig Dispense Refill    propranolol (INDERAL) 10 MG Tab Take 1 Tablet by mouth 3 times a day as needed (Anxiety). 100 Tablet 0    morphine ER (MS CONTIN) 30 MG Tab CR tablet TAKE 1 TABLET BY MOUTH THREE TIMES DAILY AS NEEDED FOR PAIN FOR 30 DAYS      oxyCODONE immediate release (ROXICODONE) 10 MG immediate release tablet TAKE 1 TAB BY MOUTH EVERY 4-6 HOURS AS NEEDED FOR PAIN, M51      escitalopram (LEXAPRO) 5 MG tablet Take 1 Tablet by mouth every day. (Patient not taking: Reported on 7/17/2023) 30 Tablet 0     "gabapentin (NEURONTIN) 100 MG Cap Take 1 Cap by mouth 3 times a day. (Patient not taking: Reported on 7/17/2023) 30 Cap 0    Multiple Vitamin (MULTI VITAMIN PO) Take 1 Tab by mouth every day.      asa/apap/caffeine (EXCEDRIN) 250-250-65 MG Tab Take 1 Tab by mouth every 6 hours as needed for Headache.      tizanidine (ZANAFLEX) 4 MG Tab Take 8 mg by mouth at bedtime.             ALLERGIES  Allergies   Allergen Reactions    Contrast Media With Iodine [Iodine] Itching     After CT with IV and oral contrast on 4/15/14    Hydrocodone Itching     Rxn - 2011  Hands itch       PHYSICAL EXAM  VITAL SIGNS:/79   Pulse 89   Temp 36.4 °C (97.6 °F) (Temporal)   Resp 19   Ht 1.575 m (5' 2\")   Wt 98.7 kg (217 lb 9.5 oz)   SpO2 95%   BMI 39.80 kg/m²       VITALS - vital signs documented prior to this note have been reviewed and noted,  GENERAL - awake, alert, oriented, GCS 15, no apparent distress, non-toxic  appearing  HEENT - normocephalic, atraumatic, pupils equal, sclera anicteric, mucus  membranes moist  NECK - supple, no meningismus, full active range of motion, trachea midline  CARDIOVASCULAR - regular rate/rhythm, no murmurs/gallops/rubs  PULMONARY - no respiratory distress, speaking in full sentences, clear to  auscultation bilaterally, no wheezing/ronchi/rales, no accessory muscle use  GASTROINTESTINAL - soft, non-tender, non-distended, no rebound, guarding,  or peritonitis  GENITOURINARY - Deferred  NEUROLOGIC - Awake alert, normal mental status, speech fluid, cognition  normal, moves all extremities  MUSCULOSKELETAL - no obvious asymmetry or deformities present  EXTREMITIES - warm, well-perfused, 2+ bilateral lower extremity edema 2+ DP and PT pulses  DERMATOLOGIC - warm, dry, no rashes, no jaundice  PSYCHIATRIC - normal affect, normal insight, normal concentration    DIAGNOSTIC STUDIES / PROCEDURES    LABS  Labs Reviewed   CBC WITH DIFFERENTIAL - Abnormal; Notable for the following components:       Result " Value    WBC 4.5 (*)     MCH 26.5 (*)     MCHC 32.1 (*)     All other components within normal limits   PROBRAIN NATRIURETIC PEPTIDE, NT - Abnormal; Notable for the following components:    NT-proBNP 136 (*)     All other components within normal limits   COMP METABOLIC PANEL   ESTIMATED GFR       No evidence of renal dysfunction, liver dysfunction, BNP is slightly elevated though no orthopnea chest pain doubt underlying CHF exacerbation      Radiologist interpretation:   No orders to display        COURSE & MEDICAL DECISION MAKING    ED COURSE:        INTERVENTIONS BY ME:  Medications - No data to display              INITIAL ASSESSMENT, COURSE AND PLAN  Care Narrative: Patient presented for evaluation of bilateral lower extremity edema differential included was not limited to renal dysfunction liver dysfunction CHF exacerbation.  Given the bilateral nature of the leg swelling, I do think underlying deep venous thrombosis is less likely at this point.  Did obtain labs, CMP did not demonstrate any evidence of renal dysfunction or liver dysfunction.  BNP was mildly elevated 136 though she has no orthopnea chest pain, doubt underlying undiagnosed CHF as a cause of this patient's lower extremity edema.  Given the patient's morbid obesity, I suspect there may be a component of venous insufficiency.  Will recommend leg elevation, compression stockings, and to return if she develops any orthopnea persistent pain or any other new or concerning symptoms.  Encouraged her to follow-up with her primary care physician.  Additional return precautions were discussed and she was discharged in stable condition.             ADDITIONAL PROBLEM LIST  BMI 39  DISPOSITION AND DISCUSSIONS      Escalation of care considered, and ultimately not performed:diagnostic imaging    Decision tools and prescription drugs considered including, but not limited to:  Considered diuresis diuretics with the patient's lower extremity edema that at this  point has not tried conservative therapy will recommend leg elevation as well as compression stockings and to follow-up with her primary care .    FINAL DIAGNOSIS  1. Peripheral edema             Electronically signed by: Doug Barrett DO ,7:36 PM 09/05/24

## 2024-09-06 NOTE — ED TRIAGE NOTES
Chief Complaint   Patient presents with    Leg Swelling     Bilat legs x 1 week. Denies CP, SOB, pain in legs.      Physical Exam  Pulmonary:      Effort: Pulmonary effort is normal.   Skin:     General: Skin is warm and dry.   Neurological:      Mental Status: She is alert.

## 2024-09-11 ENCOUNTER — OFFICE VISIT (OUTPATIENT)
Dept: MEDICAL GROUP | Facility: PHYSICIAN GROUP | Age: 49
End: 2024-09-11
Payer: COMMERCIAL

## 2024-09-11 VITALS
WEIGHT: 212 LBS | SYSTOLIC BLOOD PRESSURE: 118 MMHG | HEART RATE: 112 BPM | OXYGEN SATURATION: 94 % | HEIGHT: 62 IN | DIASTOLIC BLOOD PRESSURE: 72 MMHG | BODY MASS INDEX: 39.01 KG/M2 | TEMPERATURE: 97.3 F | RESPIRATION RATE: 20 BRPM

## 2024-09-11 DIAGNOSIS — E66.9 OBESITY (BMI 30-39.9): ICD-10-CM

## 2024-09-11 DIAGNOSIS — R60.0 BILATERAL LOWER EXTREMITY EDEMA: ICD-10-CM

## 2024-09-11 DIAGNOSIS — F41.9 ANXIETY: ICD-10-CM

## 2024-09-11 DIAGNOSIS — E55.9 VITAMIN D DEFICIENCY: ICD-10-CM

## 2024-09-11 DIAGNOSIS — Z13.0 SCREENING FOR DEFICIENCY ANEMIA: ICD-10-CM

## 2024-09-11 DIAGNOSIS — Z13.29 THYROID DISORDER SCREEN: ICD-10-CM

## 2024-09-11 DIAGNOSIS — Z23 NEED FOR VACCINATION: ICD-10-CM

## 2024-09-11 DIAGNOSIS — Z11.59 NEED FOR HEPATITIS C SCREENING TEST: ICD-10-CM

## 2024-09-11 PROCEDURE — 3078F DIAST BP <80 MM HG: CPT

## 2024-09-11 PROCEDURE — 99214 OFFICE O/P EST MOD 30 MIN: CPT | Mod: 25

## 2024-09-11 PROCEDURE — 90471 IMMUNIZATION ADMIN: CPT

## 2024-09-11 PROCEDURE — 90746 HEPB VACCINE 3 DOSE ADULT IM: CPT

## 2024-09-11 PROCEDURE — 3074F SYST BP LT 130 MM HG: CPT

## 2024-09-11 RX ORDER — BUSPIRONE HYDROCHLORIDE 5 MG/1
5 TABLET ORAL 3 TIMES DAILY
Qty: 90 TABLET | Refills: 1 | Status: SHIPPED | OUTPATIENT
Start: 2024-09-11

## 2024-09-11 RX ORDER — FUROSEMIDE 20 MG
20 TABLET ORAL DAILY
Qty: 30 TABLET | Refills: 1 | Status: SHIPPED | OUTPATIENT
Start: 2024-09-11

## 2024-09-11 RX ORDER — POTASSIUM CHLORIDE 750 MG/1
10 TABLET, EXTENDED RELEASE ORAL DAILY
Qty: 30 TABLET | Refills: 1 | Status: SHIPPED | OUTPATIENT
Start: 2024-09-11

## 2024-09-11 ASSESSMENT — PATIENT HEALTH QUESTIONNAIRE - PHQ9: CLINICAL INTERPRETATION OF PHQ2 SCORE: 0

## 2024-09-11 ASSESSMENT — FIBROSIS 4 INDEX: FIB4 SCORE: 1.35

## 2024-09-11 ASSESSMENT — ENCOUNTER SYMPTOMS: NERVOUS/ANXIOUS: 1

## 2024-09-11 NOTE — ASSESSMENT & PLAN NOTE
Chronic, unstable. Discussed healthy lifestyle recommendations.       Orders:    HEMOGLOBIN A1C; Future    Lipid Profile; Future

## 2024-09-11 NOTE — PROGRESS NOTES
"Subjective:     CC: Diagnoses of Bilateral lower extremity edema, Need for hepatitis C screening test, Obesity (BMI 30-39.9), Vitamin D deficiency, Thyroid disorder screen, Screening for deficiency anemia, Need for vaccination, and Anxiety were pertinent to this visit.    Pt presents today for ER follow up. She was seen for BLE edema 9/5/24. She has upcoming appointment to establish with me next month.    Reports having anxiety, increased life stressors.     HPI:   Tiffany presents today with    Problem   Bilateral Lower Extremity Edema   Obesity (Bmi 30-39.9)   Anxiety    She has anxiety about 1-2 times a week.  She really doesn't want to take anything that will make her groggy.  She isn't interested in an SSRI because she has heard it can cause diarrhea.          ROS:  Review of Systems   Cardiovascular:  Positive for leg swelling.   Psychiatric/Behavioral:  The patient is nervous/anxious.    All other systems reviewed and are negative.      Objective:     Exam:  /72 (BP Location: Right arm, Patient Position: Sitting, BP Cuff Size: Adult)   Pulse (!) 112   Temp 36.3 °C (97.3 °F) (Temporal)   Resp 20   Ht 1.575 m (5' 2\")   Wt 96.2 kg (212 lb)   SpO2 94%   BMI 38.78 kg/m²  Body mass index is 38.78 kg/m².    Physical Exam  Vitals reviewed.   Constitutional:       General: She is not in acute distress.     Appearance: Normal appearance. She is not ill-appearing.   HENT:      Head: Normocephalic and atraumatic.   Cardiovascular:      Rate and Rhythm: Normal rate.      Pulses: Normal pulses.   Pulmonary:      Effort: Pulmonary effort is normal. No respiratory distress.   Skin:     General: Skin is warm and dry.      Findings: No rash.   Neurological:      General: No focal deficit present.      Mental Status: She is alert and oriented to person, place, and time.   Psychiatric:         Mood and Affect: Mood normal.         Behavior: Behavior normal.         Labs:    Latest Reference Range & Units 09/05/24 " 18:47   WBC 4.8 - 10.8 K/uL 4.5 (L)   RBC 4.20 - 5.40 M/uL 4.75   Hemoglobin 12.0 - 16.0 g/dL 12.6   Hematocrit 37.0 - 47.0 % 39.3   MCV 81.4 - 97.8 fL 82.7   MCH 27.0 - 33.0 pg 26.5 (L)   MCHC 32.2 - 35.5 g/dL 32.1 (L)   RDW 35.9 - 50.0 fL 38.7   Platelet Count 164 - 446 K/uL 191   MPV 9.0 - 12.9 fL 9.1   Neutrophils-Polys 44.00 - 72.00 % 56.60   Neutrophils (Absolute) 1.82 - 7.42 K/uL 2.52   Lymphocytes 22.00 - 41.00 % 29.80   Lymphs (Absolute) 1.00 - 4.80 K/uL 1.33   Monocytes 0.00 - 13.40 % 7.40   Monos (Absolute) 0.00 - 0.85 K/uL 0.33   Eosinophils 0.00 - 6.90 % 5.60   Eos (Absolute) 0.00 - 0.51 K/uL 0.25   Basophils 0.00 - 1.80 % 0.40   Baso (Absolute) 0.00 - 0.12 K/uL 0.02   Immature Granulocytes 0.00 - 0.90 % 0.20   Immature Granulocytes (abs) 0.00 - 0.11 K/uL 0.01   Nucleated RBC 0.00 - 0.20 /100 WBC 0.00   NRBC (Absolute) K/uL 0.00   Sodium 135 - 145 mmol/L 142   Potassium 3.6 - 5.5 mmol/L 4.0   Chloride 96 - 112 mmol/L 107   Co2 20 - 33 mmol/L 22   Anion Gap 7.0 - 16.0  13.0   Glucose 65 - 99 mg/dL 83   Bun 8 - 22 mg/dL 13   Creatinine 0.50 - 1.40 mg/dL 0.62   GFR (CKD-EPI) >60 mL/min/1.73 m 2 109   Calcium 8.4 - 10.2 mg/dL 8.5   Correct Calcium 8.5 - 10.5 mg/dL 8.9   AST(SGOT) 12 - 45 U/L 19   ALT(SGPT) 2 - 50 U/L 13   Alkaline Phosphatase 30 - 99 U/L 89   Total Bilirubin 0.1 - 1.5 mg/dL 0.2   Albumin 3.2 - 4.9 g/dL 3.5   Total Protein 6.0 - 8.2 g/dL 6.1   Globulin 1.9 - 3.5 g/dL 2.6   A-G Ratio g/dL 1.3   NT-proBNP 0 - 125 pg/mL 136 (H)   (L): Data is abnormally low  (H): Data is abnormally high    Assessment & Plan:     48 y.o. female with the following -     Assessment & Plan  Bilateral lower extremity edema  Acute, unstable. Was seen in ER for swelling and pain to LE. She reports pain is worse at night, reports pain with covers touching feet, difficulty wearing shoes due to swelling and pain.  She does report she dropped a cell phone on the dorsal aspect of right foot about 3+ weeks ago and had  bruising and swelling locally, but current symptoms are affecting both feet.    2+edema to ankles, 2+ pedal pulses, CRT brisk bilaterally.    Orders:    US-EXTREMITY VENOUS LOWER BILAT; Future    furosemide (LASIX) 20 MG Tab; Take 1 Tablet by mouth every day.    TSH; Future    potassium chloride ER (KLOR-CON) 10 MEQ tablet; Take 1 Tablet by mouth every day.    EC-ECHOCARDIOGRAM COMPLETE W/O CONT; Future    Need for hepatitis C screening test    Orders:    HEP C VIRUS ANTIBODY; Future    Obesity (BMI 30-39.9)  Chronic, unstable. Discussed healthy lifestyle recommendations.       Orders:    HEMOGLOBIN A1C; Future    Lipid Profile; Future    Vitamin D deficiency    Orders:    VITAMIN D,25 HYDROXY (DEFICIENCY); Future    Thyroid disorder screen    Orders:    TSH; Future    Screening for deficiency anemia    Orders:    IRON/TOTAL IRON BIND; Future    FERRITIN; Future    Need for vaccination    Orders:    Hepatitis B Vaccine Adult 20+    Anxiety  Chronic, unstable. Reports daily symptoms, increased life stressors, menopausal symptoms. Reports having racing thoughts, jitters, tremors associated with this.   Discussed treatment options.   Reports she has been doing counseling through work sponsored program  Will try buspar     Orders:    busPIRone (BUSPAR) 5 MG tablet; Take 1 Tablet by mouth 3 times a day.       Patient was educated in proper administration of medication(s) ordered today including safety, possible SE, risks, benefits, rationale and alternatives to therapy.   Supportive care, differential diagnoses, and indications for immediate follow-up discussed with patient.    Pathogenesis of diagnosis discussed including typical length and natural progression.    Instructed to return to clinic or nearest emergency department for any change in condition, further concerns, or worsening of symptoms.  Patient states understanding of the plan of care and discharge instructions.    No follow-ups on file.    Please note that  this dictation was created using voice recognition software. I have made every reasonable attempt to correct obvious errors, but I expect that there are errors of grammar and possibly content that I did not discover before finalizing the note.

## 2024-09-11 NOTE — ASSESSMENT & PLAN NOTE
Chronic, unstable. Reports daily symptoms, increased life stressors, menopausal symptoms. Reports having racing thoughts, jitters, tremors associated with this.   Discussed treatment options.   Reports she has been doing counseling through work sponsored program  Will try buspar     Orders:    busPIRone (BUSPAR) 5 MG tablet; Take 1 Tablet by mouth 3 times a day.

## 2024-09-11 NOTE — ASSESSMENT & PLAN NOTE
Acute, unstable. Was seen in ER for swelling and pain to LE. She reports pain is worse at night, reports pain with covers touching feet, difficulty wearing shoes due to swelling and pain.  She does report she dropped a cell phone on the dorsal aspect of right foot about 3+ weeks ago and had bruising and swelling locally, but current symptoms are affecting both feet.    2+edema to ankles, 2+ pedal pulses, CRT brisk bilaterally.    Orders:    US-EXTREMITY VENOUS LOWER BILAT; Future    furosemide (LASIX) 20 MG Tab; Take 1 Tablet by mouth every day.    TSH; Future    potassium chloride ER (KLOR-CON) 10 MEQ tablet; Take 1 Tablet by mouth every day.    EC-ECHOCARDIOGRAM COMPLETE W/O CONT; Future

## 2024-11-04 DIAGNOSIS — R60.0 BILATERAL LOWER EXTREMITY EDEMA: ICD-10-CM

## 2024-11-04 DIAGNOSIS — F41.9 ANXIETY: ICD-10-CM

## 2024-11-04 RX ORDER — FUROSEMIDE 20 MG/1
20 TABLET ORAL DAILY
Qty: 90 TABLET | Refills: 1 | Status: SHIPPED | OUTPATIENT
Start: 2024-11-04

## 2024-11-04 RX ORDER — POTASSIUM CHLORIDE 750 MG/1
10 TABLET, FILM COATED, EXTENDED RELEASE ORAL
Qty: 90 TABLET | Refills: 1 | Status: SHIPPED | OUTPATIENT
Start: 2024-11-04

## 2024-11-04 RX ORDER — BUSPIRONE HYDROCHLORIDE 5 MG/1
5 TABLET ORAL 3 TIMES DAILY
Qty: 270 TABLET | Refills: 1 | Status: SHIPPED | OUTPATIENT
Start: 2024-11-04 | End: 2025-05-03

## 2024-11-04 NOTE — TELEPHONE ENCOUNTER
Received request via: Patient    Was the patient seen in the last year in this department? Yes    Does the patient have an active prescription (recently filled or refills available) for medication(s) requested? No    Pharmacy Name: Pharmacy:   [id number] - [facility], [place] - [address] [address] [place] [facility], [place] - [address] [facility] [id number], [place] - [address]     Does the patient have [facility] Plus and need 100-day supply? (This applies to ALL medications) Patient does not have SCP